# Patient Record
Sex: FEMALE | Race: WHITE | NOT HISPANIC OR LATINO | URBAN - METROPOLITAN AREA
[De-identification: names, ages, dates, MRNs, and addresses within clinical notes are randomized per-mention and may not be internally consistent; named-entity substitution may affect disease eponyms.]

---

## 2023-10-09 ENCOUNTER — HOSPITAL ENCOUNTER (EMERGENCY)
Facility: HOSPITAL | Age: 63
Discharge: HOME | End: 2023-10-09
Payer: COMMERCIAL

## 2023-10-09 VITALS
DIASTOLIC BLOOD PRESSURE: 92 MMHG | BODY MASS INDEX: 30.31 KG/M2 | WEIGHT: 164.68 LBS | SYSTOLIC BLOOD PRESSURE: 128 MMHG | RESPIRATION RATE: 12 BRPM | HEIGHT: 62 IN | OXYGEN SATURATION: 100 % | HEART RATE: 99 BPM | TEMPERATURE: 99.1 F

## 2023-10-09 DIAGNOSIS — R21 RASH: Primary | ICD-10-CM

## 2023-10-09 PROCEDURE — 99283 EMERGENCY DEPT VISIT LOW MDM: CPT

## 2023-10-09 PROCEDURE — 2500000001 HC RX 250 WO HCPCS SELF ADMINISTERED DRUGS (ALT 637 FOR MEDICARE OP): Performed by: STUDENT IN AN ORGANIZED HEALTH CARE EDUCATION/TRAINING PROGRAM

## 2023-10-09 PROCEDURE — 2500000004 HC RX 250 GENERAL PHARMACY W/ HCPCS (ALT 636 FOR OP/ED): Performed by: STUDENT IN AN ORGANIZED HEALTH CARE EDUCATION/TRAINING PROGRAM

## 2023-10-09 RX ORDER — HYDROXYZINE PAMOATE 25 MG/1
50 CAPSULE ORAL ONCE
Status: COMPLETED | OUTPATIENT
Start: 2023-10-09 | End: 2023-10-09

## 2023-10-09 RX ORDER — HYDROXYZINE HYDROCHLORIDE 10 MG/1
50 TABLET, FILM COATED ORAL ONCE
Status: DISCONTINUED | OUTPATIENT
Start: 2023-10-09 | End: 2023-10-09

## 2023-10-09 RX ORDER — PREDNISONE 20 MG/1
60 TABLET ORAL ONCE
Status: COMPLETED | OUTPATIENT
Start: 2023-10-09 | End: 2023-10-09

## 2023-10-09 RX ORDER — LISINOPRIL 20 MG/1
20 TABLET ORAL DAILY
COMMUNITY
Start: 2023-05-12

## 2023-10-09 RX ORDER — HYDROCHLOROTHIAZIDE 12.5 MG/1
12.5 TABLET ORAL DAILY
COMMUNITY
Start: 2023-05-12

## 2023-10-09 RX ORDER — PREDNISONE 20 MG/1
TABLET ORAL
Qty: 24 TABLET | Refills: 0 | Status: SHIPPED | OUTPATIENT
Start: 2023-10-09 | End: 2023-10-24

## 2023-10-09 RX ORDER — FAMOTIDINE 20 MG/1
20 TABLET, FILM COATED ORAL ONCE
Status: COMPLETED | OUTPATIENT
Start: 2023-10-09 | End: 2023-10-09

## 2023-10-09 RX ADMIN — HYDROXYZINE PAMOATE 50 MG: 25 CAPSULE ORAL at 03:28

## 2023-10-09 RX ADMIN — FAMOTIDINE 20 MG: 20 TABLET, FILM COATED ORAL at 03:28

## 2023-10-09 RX ADMIN — PREDNISONE 60 MG: 20 TABLET ORAL at 03:28

## 2023-10-09 ASSESSMENT — PAIN SCALES - GENERAL
PAINLEVEL_OUTOF10: 0 - NO PAIN
PAINLEVEL_OUTOF10: 0 - NO PAIN

## 2023-10-09 ASSESSMENT — COLUMBIA-SUICIDE SEVERITY RATING SCALE - C-SSRS
6. HAVE YOU EVER DONE ANYTHING, STARTED TO DO ANYTHING, OR PREPARED TO DO ANYTHING TO END YOUR LIFE?: NO
1. IN THE PAST MONTH, HAVE YOU WISHED YOU WERE DEAD OR WISHED YOU COULD GO TO SLEEP AND NOT WAKE UP?: NO
2. HAVE YOU ACTUALLY HAD ANY THOUGHTS OF KILLING YOURSELF?: NO

## 2023-10-09 ASSESSMENT — PAIN - FUNCTIONAL ASSESSMENT: PAIN_FUNCTIONAL_ASSESSMENT: 0-10

## 2023-10-09 NOTE — ED PROVIDER NOTES
"HPI   Chief Complaint   Patient presents with    Rash     3 days ago bought a bra from [employer] and wore it w/o washing it and after an hour it felt \"like it was burning around the band\" and there was a rash and it has since spread to all over her body. Has been managing it w/ benadryl and 1% Hydrocortisone cream. Not doing enough. Itchy, rash is raised red, blotchy and itchy all over her body and still spreading. Patient denies any cold/flu symptoms but has newly started getting winded going up the stairs. Has not had any vaccines recently. Travels every month.       Patient presents to the ED with rash which has been spreading for the past 3 days.  States that she wore a new article of clothing without washing it first when the rash started, and the rash was limited to where the new article of clothing touched her skin.  The following day, the rash started spreading distally from this point, and today she now has the rash on all of her limbs, her trunk, and her neck.  No mucous membrane involvement.  No constitutional symptoms.  Rash is intensely pruritic, and she has been using 1% hydrocortisone cream topically and taking 50 mg of Benadryl every 6 hours without relief.  Denies taking any new medications.  States that she did start using new detergent but that was 2 weeks ago.  No contacts with similar rash.        Review of Systems   All other systems reviewed and are negative.                      No data recorded                Patient History   Past Medical History:   Diagnosis Date    Hypertension      Past Surgical History:   Procedure Laterality Date    ABDOMINAL SURGERY      COLON SURGERY       No family history on file.  Social History     Tobacco Use    Smoking status: Former     Types: Cigarettes    Smokeless tobacco: Never   Vaping Use    Vaping Use: Never used   Substance Use Topics    Alcohol use: Yes     Alcohol/week: 9.0 standard drinks of alcohol     Types: 9 Standard drinks or equivalent per week "    Drug use: Yes     Frequency: 1.0 times per week     Types: Other     Comment: CBD w/ low THC       Physical Exam   ED Triage Vitals [10/09/23 0250]   Temp Heart Rate Resp BP   37.3 °C (99.1 °F) 99 12 (!) 128/92      SpO2 Temp Source Heart Rate Source Patient Position   98 % Oral Monitor Sitting      BP Location FiO2 (%)     Left arm --       Physical Exam  Constitutional:       General: She is not in acute distress.     Appearance: Normal appearance.   HENT:      Head: Normocephalic and atraumatic.      Nose: Nose normal.      Mouth/Throat:      Mouth: Mucous membranes are moist.      Pharynx: Oropharynx is clear.   Eyes:      Extraocular Movements: Extraocular movements intact.      Conjunctiva/sclera: Conjunctivae normal.      Pupils: Pupils are equal, round, and reactive to light.   Musculoskeletal:         General: No swelling or deformity.      Cervical back: Normal range of motion and neck supple.   Skin:     General: Skin is warm and dry.      Findings: Rash present. Rash is macular, papular and urticarial. Rash is not crusting, nodular, purpuric, scaling or vesicular.      Comments: Confluent erythematous papules with large plaque like areas on all limbs, trunk, and neck, blanchable   Neurological:      General: No focal deficit present.      Mental Status: She is alert and oriented to person, place, and time. Mental status is at baseline.   Psychiatric:         Mood and Affect: Mood normal.         Behavior: Behavior normal.         Thought Content: Thought content normal.         Judgment: Judgment normal.         ED Course & MDM   Diagnoses as of 10/09/23 0317   Rash       Medical Decision Making  Patient presents with spreading rash which appears to be urticarial, and extensive.  Started the patient on prednisone 60 mg and prescribed a 15-day taper, and also gave patient Vistaril and Pepcid.  She can continue to take Benadryl and may add Pepcid outpatient if desired.  Additionally, can continue to use  hydrocortisone cream topically for itch relief.           Delmi Smith MD  10/09/23 7564

## 2023-10-09 NOTE — ED TRIAGE NOTES
"Patient has full body rash minus face and feet. Patient believes it is related to a bra she did not wash after buying and had a burning sensation around the band around her chest. It has now spread all over since in 3 days. It is described as \"incredibly itchy\". Denies changes in daily habits, new detergents or medications. Has been trying to manage symptoms w/ benadryl and hydrocortisone cream, w/o much results.     Patient is roomed in ED 2, triage complete and physician has seen patient and completed her initial assessment. Nothing further at the moment. Patient is resting comfortably w/o distress.   "

## 2023-10-09 NOTE — DISCHARGE INSTRUCTIONS
May continue to take Benadryl 50 mg every 6 hours, and can add Pepcid if desired. Take steroid as directed. May use topical medication as needed.

## 2025-06-02 ENCOUNTER — HOSPITAL ENCOUNTER (OUTPATIENT)
Dept: RADIOLOGY | Facility: HOSPITAL | Age: 65
Discharge: HOME | End: 2025-06-02
Payer: MEDICARE

## 2025-06-02 ENCOUNTER — OFFICE VISIT (OUTPATIENT)
Dept: ORTHOPEDIC SURGERY | Facility: HOSPITAL | Age: 65
End: 2025-06-02
Payer: MEDICARE

## 2025-06-02 DIAGNOSIS — M25.559 HIP PAIN, ACUTE, UNSPECIFIED LATERALITY: ICD-10-CM

## 2025-06-02 DIAGNOSIS — M16.11 PRIMARY OSTEOARTHRITIS OF RIGHT HIP: Primary | ICD-10-CM

## 2025-06-02 PROCEDURE — 99212 OFFICE O/P EST SF 10 MIN: CPT | Performed by: STUDENT IN AN ORGANIZED HEALTH CARE EDUCATION/TRAINING PROGRAM

## 2025-06-02 PROCEDURE — 1125F AMNT PAIN NOTED PAIN PRSNT: CPT | Performed by: STUDENT IN AN ORGANIZED HEALTH CARE EDUCATION/TRAINING PROGRAM

## 2025-06-02 PROCEDURE — 99205 OFFICE O/P NEW HI 60 MIN: CPT | Performed by: STUDENT IN AN ORGANIZED HEALTH CARE EDUCATION/TRAINING PROGRAM

## 2025-06-02 PROCEDURE — 73521 X-RAY EXAM HIPS BI 2 VIEWS: CPT

## 2025-06-02 ASSESSMENT — PAIN - FUNCTIONAL ASSESSMENT: PAIN_FUNCTIONAL_ASSESSMENT: 0-10

## 2025-06-02 ASSESSMENT — PAIN DESCRIPTION - DESCRIPTORS: DESCRIPTORS: ACHING

## 2025-06-02 ASSESSMENT — PAIN SCALES - GENERAL: PAINLEVEL_OUTOF10: 3

## 2025-06-02 NOTE — PROGRESS NOTES
Abby Morgan MD   Adult Reconstruction and Joint Replacement Surgery  Phone: [phone]     Fax: [phone]       Name: Jo-Ann Lugo  Age: 65 y.o.   : 1960   Date of Visit: 2025    INITIAL CONSULTATION    CC: RIGHT hip pain    HPI:  This patient presents with 1 years of RIGHT hip pain. She reports 1 year of insidious onset groin and lateal hip pain, worse with activity. She reprots symptoms are most bothersome at the end of the day and when she transitions from sitting to standing.     Patient has tried the following Activity modification, NSAIDs, Tylenol (arthritis dosing) , Physical therapy, and Xray. Date of last steroid injection: none. Patient does not have pain at night. Patient does not report falls related to this problem. Patient is able to walk unlimited blocks. Patient is currently using nothing as assistive device. Primarily complains of groin and lateral hip pain. Patient has difficulty with donning and doffing shoes and socks and getting in/out of car . The pain is significantly impacting their ability to perform activities of daily living. Patient reports no longer able to do activities such as transition from sitting to standing. The patient feels neither leg is shorter.     Focused History  *Directly transcribed from patient self-reported intake sheet and [facility] Records.      PMH: Reviewed and PE/DVT: none  PSH: Reviewed , Hip/Knee replacement: none, Hip/Knee surgery: none, Anesthesia complications: none, Spine surgery: none, Surgical infection: none, and Weight loss surgery: none  SHx: Reviewed, Occupation: self employed, Current smoker: denies, EtOH intake weekly: 7 drinks/week, Social support: G[employer], and Preferred physical activities: piliates/walking/photography/art  Jehovah´s Witness: no  Meds: Reviewed, Current Anticoagulants: none, Weight loss medication: none, and Current Opioids: none  Allergies: Reviewed  and The patient reports no  contraindications or allergies to cephalosporins, aspirin, NSAIDs or opioids, except as noted above.  Dental Hx: Last routine cleanin2025 and All invasive dental work must be completed 3+ months prior to joint replacement surgery. Dental cleaning must be completed 6+ weeks prior to joint replacement surgery. Patient are to avoid any invasive dental work 3-6 months post-surgically.   FH: No family history of any bleeding or clotting disorders.    PROMS/HISTORY  PROMs   No questionnaires on file.     Medical History[1]    Medical History[2]  Documented in chart and reviewed.     Surgical History[3]    Allergies: She is allergic to sulfa (sulfonamide antibiotics).     Medications:  Current Outpatient Medications   Medication Instructions    hydroCHLOROthiazide (MICROZIDE) 12.5 mg, oral, Daily    lisinopril 20 mg, oral, Daily       Family History[4]  Documented in chart and reviewed.     Social History     Tobacco Use    Smoking status: Former     Types: Cigarettes    Smokeless tobacco: Never   Substance Use Topics    Alcohol use: Yes     Alcohol/week: 9.0 standard drinks of alcohol     Types: 9 Standard drinks or equivalent per week        Review of Systems: Review of systems completed with medical assistant intake. Please refer to this note.     Physical Exam:  BMI: normal.    General: The patient is well appearing and has an appropriate affect.     Neurological Examination: SILT in SPN/DPN/Sural/Saphenous/Tibial nerves. 5/5 EHL, FHL, Tibial anterior, Gastrocnemius. Coordination grossly intact.     Cardiovascular Exam: Capillary refill <2 seconds.     Lymphatic Examination: There is no obvious lymphatic swelling present around the involved joint.    Skin Exam: Skin around the pertinent joint is without evidence of infection or rash.    Gait: The patient ambulates with a coxalgic gait.     Lumbar spine:    No tenderness to palpation midline.    Negative straight leg raise bilaterally.    Right Hip  "Examination:  Gait: Coxalgic gait.    Examination of the hip reveals the skin to be intact.    There is mild tenderness over the greater trochanter.    There is no obvious swelling.    There is a positive Stinchfield test.    Range of motion is: full extension to 105 degrees of flexion.    The hip internally rotates to 5 degrees and externally rotates to 35 degrees.    Abduction is 35 degrees and adduction is 15 degrees.    There is groin and buttock pain with hip motion.    There is a negative straight leg raise.    Abductor strength 4/5.    Left Hip Examination:  Examination of the hip reveals the skin to be intact.    There is no tenderness over the greater trochanter.    There is no obvious swelling.    There is a negative Stinchfield test.    Range of motion is full extension to 100 degrees of flexion.    The hip internally rotates to 20 and externally rotates 40 degrees.    Abduction is 40 degrees and adduction is 20 degrees.    There is no groin and buttock pain with hip motion.    There is a negative straight leg raise.    Abductor strength 4/5.    Right Knee Examination:  Examination of the right knee reveals the skin to be intact. There is no obvious swelling.    There is no tenderness to palpation.    Range of motion is full extension to 120 degrees of flexion.    The knee is stable.    There is no grinding with range of motion.    There is no patellofemoral crepitus.    Left Knee Examination:  Examination of the left knee reveals the skin to be intact. There is no obvious swelling.    There is no tenderness to palpation.    Range of motion is full extension to 120 degrees of flexion.    The knee is stable.    There is no grinding with range of motion.    There is no patellofemoral crepitus.    Prior Labs:   Prior Labs:   No results found for: \"WBC\", \"HGB\", \"HCT\", \"MCV\", \"PLT\"   No results found for: \"INR\", \"PROTIME\"      No results found for: \"GLUCOSE\", \"CALCIUM\", \"NA\", \"K\", \"CO2\", \"CL\", \"BUN\", " "\"CREATININE\"   No results found for: \"CKTOTAL\", \"CKMB\", \"CKMBINDEX\", \"TROPONINI\"   No results found for: \"HGBA1C\"      No results found for: \"CRP\"   No results found for: \"SEDRATE\"      Radiographs:  Radiographs were personally reviewed today with the patient. There is evidence of severe RIGHT  hip osteoarthritis with bone on bone apposition.    Impression:  This patient presents with severe RIGHT  hip osteoarthritis with bone on bone apposition.     Diagnosis:   Primary osteoarthritis of right hip    Hip pain, acute, unspecified laterality     Recommendations / Plan:    I have discussed the options in detail with the patient. We have discussed anti-inflammatory medication, activity modification, physical therapy, corticosteroid injections, and total hip replacement surgery.     The risks and benefits of all these treatment options have been discussed in detail. The patient has tried at least 3 months of the above conservative treatments and continues to have disabling pain, impaired activities of daily living and worsened quality of life. The patient is a candidate for RIGHT  VERONICA total hip replacement. We discussed anterior and posterolateral surgical approaches to the hip joint with my rationale for anterior approach. Risks and benefits of all approaches were reviewed. We discussed expected rehabilitation, DVT prophylaxis, time points during recovery, likely functional outcomes and long-term issues with hip replacement procedures.    We have reviewed the typical recovery after surgery and have discussed the fact that full recovery can take up to 1 year. For anterior approach surgery, I specifically discussed the increased risk for intra-operative fracture, the risk of aseptic femoral failure, and the risk for lateral femoral cutaneous nerve injury.    The patient does not have any of the following contraindications to arthroplasty including active infection of the hip joint, systemic bacteremia, active skin " infection or an open wound at the surgical site, neuropathic arthritis or severe, rapidly progressive neurological disease.     Patient will consider proceeding with RIGHT  VERONICA. All questions were answered today. If the patient chooses to move forward with surgical scheduling, they will be enrolled in a preoperative arthroplasty teaching class and the pre-admission testing pathway. The patient was encouraged to contact their primary care physician to discuss fitness for surgery.     Social support after surgery: family  Pain medication plan: Standard (Acetominophen, Meloxicam, Oxycodone, Tramadol)   Additional preoperative considerations: None    Diagnosis: M16.11 - RIGHT hip osteoarthritis   Procedure: 16109 - Total HIP Arthroplasty (VERONICA) - ANTERIOR  Surgery Location: [facility]   Equipment Requests: VERONICA - ANTERIOR: Brewer table accessories, Midas Javier Finger Control with AS14 attachment, Cylindrical carlos alberto ([id number]) and DEPUY: Emphasys, Actis  Anesthesia: VERONICA: Regional - Spinal   Discharge: Same-day (RAPID)    \F2  _____________  Abby Morgan MD   Attending Orthopaedic Surgeon  [facility]    [facility]       This office note was transcribed with dictation software.  Please excuse any typographical errors, program misunderstandings leading to inadvertent insertions or deletions of inappropriate wording, pronoun errors and other unintentional transcription errors not noticed on proof-reading.                    [1]   Past Medical History:  Diagnosis Date    Hypertension    [2]   Past Medical History:  Diagnosis Date    Hypertension    [3]   Past Surgical History:  Procedure Laterality Date    ABDOMINAL SURGERY      COLON SURGERY     [4] No family history on file.

## 2025-06-05 PROBLEM — M16.11 PRIMARY OSTEOARTHRITIS OF RIGHT HIP: Status: ACTIVE | Noted: 2025-08-07

## 2025-07-03 ENCOUNTER — EDUCATION (OUTPATIENT)
Dept: ORTHOPEDIC SURGERY | Facility: CLINIC | Age: 65
End: 2025-07-03
Payer: MEDICARE

## 2025-07-03 NOTE — GROUP NOTE
In addition to the group class activities, Jo-Ann Lugo had the following lab work completed:  No orders of the defined types were placed in this encounter.    Jo-Ann Lugo  attended joint class on 7/3 and Did not bring a care partner to the class. The preop survey was completed and the patient provided attestation that they understand the content reviewed and that they do have a care partner identified to assist with recovery. Patient was provided with a folder of materials and instructed to call orthopedic navigator with questions.   A new History and Physical was not completed.    This class lasted approximately 2 hours and had 10 participants.   Thank you for attending our Joint Replacement class today in preparation for your upcoming surgery.  Topics discussed include:    MyChart Enrollment  Communication with Care Team  My Chart is the best form of communication to reach all of your caregivers  You can send messages to specific care givers, or a care team  Continued Education  You will be enrolled in a Total Joint Replacement care plan to receive additional education before and after surgery  You can review a short recording of the class content  Access to Medical Records  You can access test results, office notes, appointments, etc.  You can connect to other healthcare systems who use [employer] ([facility], [facility], [facility], etc.)  [email or web address]  Program Information  Using Meds to Beds is our standard process for joint replacements at [facility] to minimize issues with homegoing medications. Please let us know ahead of time if there is a reason why Meds to Beds cannot be used    Background/Understanding of Joint Replacement Surgery  Potential for same day discharge  Any questions or concerns about your specific surgery/plan are to be directed to the surgeon's office    How to Prepare for Surgery  Use of Nicotine Products/Smoking  Stop several weeks before surgery  Such products slow down the healing process and  increase risk of post-op infection and complications  Clearance for Surgery  Medical Clearance by Specialists  Dental Clearance  Cracked/Broken/Loose teeth left untreated may postpone surgery  The importance of post-op antibiotics for dental visits per surgeon protocol  Preadmission Testing  **Potential for postponed surgery if appropriate clearance is not obtained  Medication Instruction  Follow instructions provided by the doctor who prescribes your medication (typically, but not limited to cardiologist)  Preadmission testing will provide additional instructions during your appointment on what to stop and what to take as you get closer to surgery  For clarification of these instructions, please call preadmission testing directly - [phone]  Tips for Preparing the home for discharge from the hospital  Care Partner  Requirement for surgery, the patient must have a plan to have help at home  Potential for postponed surgery if plan for home support cannot be established  How the care partner can help after surgery  CHG Body Wash/Mouth Wash  Follow the instructions given at preadmission testing  Body wash is to be used on the body and hair for 5 washes  Mouthwash is to be used the night before and morning of surgery  **This is a system-wide protocol developed by infectious disease professionals, we will not alter our recommendations for those with sensitive skin or those who have special hair needs.  Please follow the instructions as they are written as this will provide the best infection prevention measures for surgery.  Should you have an allergy to one of the products, please discuss with your preadmission team**    What to Expect in the Hospital/At Home  Morning of Surgery NPO Guidelines  Nothing to eat after midnight  Water can be consumed up to 2 hours prior to arrival  Surgical and Post-Surgical Care Team  Surgical Team  Anesthesia Team  Nursing  Physical Therapy  Care [facility]  Arrival Instructions  Arrive at the time provided to you  Consider traffic patterns (rush-hour) based on arrival time  Have arrangements made for a ride home  If discharging same day, care partner should remain at the hospital  Recovering after Surgery  Recovery Room - Visitors are not brought back  Transition to hospital room - 2nd Floor, Visitors will be directed to your room  The presence of and strategies for controlling surgical pain and swelling  The importance of early mobility  Side effects after surgery  What to expect if staying overnight    Discharge Planning  The intended plan for discharge will be for patients to discharge home  All patients require a care partner (family, friend, neighbor, etc.) to stay with the patient for the first few nights after surgery  The inability to secure help at home will postpone surgery  Home Care Services set up per surgeon order  Physical Therapy  Occupational Therapy  **If desired, private duty care can be arranged by the patient ahead of time**  Outpatient Physical Therapy per surgeon order    Recovering at Home  Wound Care  Follow wound care instructions found in your discharge paperwork  Bandage is water-resistant and you may shower with the bandage  Do not scrub directly over the bandage  Do not submerge in water until cleared (bathtub, hot tub, pool, etc.)    Post-Op Risk Prevention  Infection Prevention  Promptly seek treatment for any infections post-operatively  Routine dental visits must be postponed for 3 months after surgery  Your surgeon may require antibiotics prior to future dental visits  Any concerns for infection not related directly to the knee or the hip should be managed by your primary care provider  Blood Clots  Be sure to complete the course of blood thinning medication as prescribed by your surgeon  Movement every 1-2 hours during the day is encouraged to prevent blood clots  Monitor for signs of blood clots  Wear compression stockings as  prescribed by your surgeon  Constipation  Constipation is common following surgery  Drink plenty of fluids  Take stool softener/laxative as prescribed by your surgeon  Move around frequently  Eat foods high in fiber  Fall Prevention  Prepare home ahead of time to clear space to move with walker  Remove throw rugs and electrical cords from walkways  Install railings near any stairways with more than 2 steps  Use night lights for increased visibility at night  Continue to use your assistive device until cleared by surgeon or physical therapy  Dislocation Prevention - Not all procedures will have dislocation precautions  Follow dislocation precautions provided by your surgeon  It is OK to resume sexual activity about 6 weeks following surgery  Be sure to follow any dislocation precautions assigned    Durable Medical Equipment  Cold Therapy  Breg Cold Therapy Machines  Ice/Gel Packs  Assistive Devices  Folding Walker with Wheels (in the front only)  No Rollators  Crutches if approved by Physical Therapy and Surgeon after surgery  Hip Kits  Raised Toilet Seats  Additional Compression Stockings    Joint Preservation  Healthy Activities when Cleared  Walking  Swimming  Bike Riding  Activities to Avoid  Refrain from repetitive motions which have a high impact on the joint  Gradual Progression  Progress activity slowly, listen to your body  Common Findings - NORMAL after surgery  Clicking/Grinding  Numbness near incision    Physical Therapy  Prehabilitation exercises  START TODAY ON BOTH LEGS  Surgery Specific Precautions  Follow surgery specific precautions found in your discharge paperwork    Follow-Up Visit  All patients will see their surgeon for a follow up visit after surgery  The visit may range from 2-6 weeks after surgery and is surgeon specific      Please don't hesitate to reach out if you have any additional questions or concerns.    Scott Garland BSN, RN  Luzmaria Corado BSN, RN-BC  Orthopedic Nurses  [place]  [facility]   [phone]

## 2025-07-09 ENCOUNTER — PATIENT MESSAGE (OUTPATIENT)
Dept: PAIN MEDICINE | Facility: CLINIC | Age: 65
End: 2025-07-09
Payer: MEDICARE

## 2025-07-23 ENCOUNTER — APPOINTMENT (OUTPATIENT)
Dept: ORTHOPEDIC SURGERY | Facility: HOSPITAL | Age: 65
End: 2025-07-23
Payer: MEDICARE

## 2025-07-28 ENCOUNTER — APPOINTMENT (OUTPATIENT)
Dept: RADIOLOGY | Facility: HOSPITAL | Age: 65
End: 2025-07-28
Payer: MEDICARE

## 2025-07-28 ENCOUNTER — OFFICE VISIT (OUTPATIENT)
Dept: ORTHOPEDIC SURGERY | Facility: HOSPITAL | Age: 65
End: 2025-07-28
Payer: MEDICARE

## 2025-07-28 ENCOUNTER — TELEPHONE (OUTPATIENT)
Dept: PREADMISSION TESTING | Facility: HOSPITAL | Age: 65
End: 2025-07-28

## 2025-07-28 ENCOUNTER — HOSPITAL ENCOUNTER (OUTPATIENT)
Dept: RADIOLOGY | Facility: HOSPITAL | Age: 65
Discharge: HOME | End: 2025-07-28
Payer: MEDICARE

## 2025-07-28 ENCOUNTER — HOSPITAL ENCOUNTER (OUTPATIENT)
Dept: RADIOLOGY | Facility: HOSPITAL | Age: 65
End: 2025-07-28
Payer: MEDICARE

## 2025-07-28 DIAGNOSIS — M16.11 PRIMARY OSTEOARTHRITIS OF RIGHT HIP: ICD-10-CM

## 2025-07-28 DIAGNOSIS — M16.11 PRIMARY OSTEOARTHRITIS OF RIGHT HIP: Primary | ICD-10-CM

## 2025-07-28 PROCEDURE — 99212 OFFICE O/P EST SF 10 MIN: CPT | Performed by: STUDENT IN AN ORGANIZED HEALTH CARE EDUCATION/TRAINING PROGRAM

## 2025-07-28 PROCEDURE — 77073 BONE LENGTH STUDIES: CPT

## 2025-07-28 PROCEDURE — 1125F AMNT PAIN NOTED PAIN PRSNT: CPT | Performed by: STUDENT IN AN ORGANIZED HEALTH CARE EDUCATION/TRAINING PROGRAM

## 2025-07-28 PROCEDURE — 77073 BONE LENGTH STUDIES: CPT | Performed by: RADIOLOGY

## 2025-07-28 PROCEDURE — 72170 X-RAY EXAM OF PELVIS: CPT

## 2025-07-28 PROCEDURE — 1159F MED LIST DOCD IN RCRD: CPT | Performed by: STUDENT IN AN ORGANIZED HEALTH CARE EDUCATION/TRAINING PROGRAM

## 2025-07-28 PROCEDURE — 99214 OFFICE O/P EST MOD 30 MIN: CPT | Performed by: STUDENT IN AN ORGANIZED HEALTH CARE EDUCATION/TRAINING PROGRAM

## 2025-07-28 ASSESSMENT — PAIN SCALES - GENERAL: PAINLEVEL_OUTOF10: 8

## 2025-07-28 ASSESSMENT — PAIN DESCRIPTION - DESCRIPTORS: DESCRIPTORS: ACHING

## 2025-07-28 ASSESSMENT — PAIN - FUNCTIONAL ASSESSMENT: PAIN_FUNCTIONAL_ASSESSMENT: 0-10

## 2025-07-28 NOTE — PROGRESS NOTES
Abby Morgan MD   Adult Reconstruction and Joint Replacement Surgery  Phone: [phone]     Fax: [phone]       Name: Jo-Ann Lugo  Age: 65 y.o.   : 1960   Date of Visit: 2025    PREOPERATIVE CONSULTATION    CC: Right hip pain    HPI:  This patient presents for discussion of upcoming RIGHT total HIP arthroplasty.     The patient reports 1 years of RIGHT hip pain.     The patient has tried at least 3 months of conservative treatments and continues to have disabling pain, impaired activities of daily living and worsened quality of life. They rate their pain and/or debilitation as severe at times.     Patient has tried the following Activity modification, NSAIDs, Tylenol (arthritis dosing) , Physical therapy, and Xray. Date of last steroid injection: none. Patient does not have pain at night. Patient does not report falls related to this problem. Patient is able to walk unlimited blocks. Patient is currently using nothing as assistive device. Primarily complains of groin and lateral hip pain. Patient has difficulty with donning and doffing shoes and socks and getting in/out of car . The pain is significantly impacting their ability to perform activities of daily living. Patient reports no longer able to do activities such as transition from sitting to standing. The patient feels neither leg is shorter.      Focused History  *Directly transcribed from patient self-reported intake sheet and [facility] Medical Records.       PMH: Reviewed and PE/DVT: none  PSH: Reviewed , Hip/Knee replacement: none, Hip/Knee surgery: none, Anesthesia complications: none, Spine surgery: none, Surgical infection: none, and Weight loss surgery: none  SHx: Reviewed, Occupation: self employed, Current smoker: denies, EtOH intake weekly: 7 drinks/week, Social support: Gino Cotton, and Preferred physical activities: piliates/walking/photography/art  Jehovah´s Witness: no  Meds: Reviewed, Current Anticoagulants: none,  Weight loss medication: none, and Current Opioids: none  Allergies: Reviewed  and The patient reports no contraindications or allergies to cephalosporins, aspirin, NSAIDs or opioids, except as noted above.  Dental Hx: Last routine cleanin2025 and All invasive dental work must be completed 3+ months prior to joint replacement surgery. Dental cleaning must be completed 6+ weeks prior to joint replacement surgery. Patient are to avoid any invasive dental work 3-6 months post-surgically.   FH: No family history of any bleeding or clotting disorders.    PROMs/HISTORY  PROMs   Hoos Jr-Hip Disability And Osteoarthritis Outcome Score For Joint Replacement    2025  7:43 AM EDT - Filed by Patient   Instructions    What amount of hip pain have you experienced the last week during the following activities?   Going up or down stairs Mild   Walking on an uneven surface Moderate   The following questions concern your physical function. By this we mean your ability to move around and to look after yourself. For each of the following activities please indicate the degree of difficulty you have experienced in the last week due to your hip.   Rising from sitting Severe   Bending to floor/ an object Moderate   Lying in bed (turning over, maintaining hip position) Mild   Sitting Mild   Hoos Jr Scoring (range: 0 - 100) 58.93     [facility] 12 Item Health Survey (Vr-12)    2025  7:47 AM EDT - Filed by Patient   In general, would you say your health is: Very Good   The following questions are about activities you might do during a typical day. Does your health now limit you in these activities?  If so, how much?   Moderate activities, such as moving a table, pushing a vacuum , bowling, or playing golf? Yes, Limited A Little   Climbing several flights of stairs? Yes, Limited A Little   During the past 4 weeks, have you had any of the following problems with your work or other regular daily activities as a  result of your physical health?   Accomplished less than you would like. Yes, A Little Of The Time   Were limited in the kind of work or other activities. Yes, A Little Of The Time   During the past 4 weeks, have you had any of the following problems with your work or other regular daily activities as a result of any emotional problems (such as feeling depressed or anxious)?   Accomplished less than you would like No, None Of The Time   Didn't do work or other activities as carefully as usual No, None Of The Time   During the past 4 weeks, how much did pain interfere with your normal work (including both work outside the home and house work)? A Little Bit   How much of the time during the past 4 weeks:   Have you felt calm and peaceful? Most Of The Time   Did you have a lot of energy? A Good Bit Of The Time   Have you felt downhearted and blue? None Of The Time   During the past 4 weeks, how much of the time has your physical health or emotional problems interfered with your social activities (like visiting with friends, relatives, etc.)? A Little Of The Time   Compared to one year ago, how would you rate your physical health in general now? Slightly Worse   Compared to one year ago, how would you rate your emotional problems (such as feeling anxious, depressed or irritable) now? About The Same   Ort Vr-12 Question Pcs (range: 0 - 100) 40.11   Ort Vr-12 Question McS (range: 0 - 100) 60.42          Medical History[1]    Medical History[2]  Documented in chart and reviewed.     Surgical History[3]    Allergies: She is allergic to sulfa (sulfonamide antibiotics).     Medications:  Current Outpatient Medications   Medication Instructions    hydroCHLOROthiazide (MICROZIDE) 12.5 mg, oral, Daily    lisinopril 20 mg, oral, Daily       Family History[4]  Documented in chart and reviewed.     Social History     Tobacco Use    Smoking status: Former     Types: Cigarettes    Smokeless tobacco: Never   Substance Use Topics     Alcohol use: Yes     Alcohol/week: 9.0 standard drinks of alcohol     Types: 9 Standard drinks or equivalent per week        Review of Systems: Review of systems completed with medical assistant intake. Please refer to this note.     Physical Exam:  BMI: Normal.    General: The patient is well appearing and has an appropriate affect.      Neurological Examination: SILT in SPN/DPN/Sural/Saphenous/Tibial nerves. 5/5 EHL, FHL, Tibial anterior, Gastrocnemius. Coordination grossly intact.      Cardiovascular Exam: Capillary refill <2 seconds.      Lymphatic Examination: There is no obvious lymphatic swelling present around the involved joint.     Skin Exam: Skin around the pertinent joint is without evidence of infection or rash.     Gait: The patient ambulates with a coxalgic gait.      Lumbar spine:     No tenderness to palpation midline.     Negative straight leg raise bilaterally.     Right Hip Examination:  Gait: Coxalgic gait.     Examination of the hip reveals the skin to be intact.     There is mild tenderness over the greater trochanter.     There is no obvious swelling.     There is a positive Stinchfield test.     Range of motion is: full extension to 105 degrees of flexion.     The hip internally rotates to 5 degrees and externally rotates to 35 degrees.     Abduction is 35 degrees and adduction is 15 degrees.     There is groin and buttock pain with hip motion.     There is a negative straight leg raise.     Abductor strength 4/5.     Left Hip Examination:  Examination of the hip reveals the skin to be intact.     There is no tenderness over the greater trochanter.     There is no obvious swelling.     There is a negative Stinchfield test.     Range of motion is full extension to 100 degrees of flexion.     The hip internally rotates to 20 and externally rotates 40 degrees.     Abduction is 40 degrees and adduction is 20 degrees.     There is no groin and buttock pain with hip motion.     There is a negative  straight leg raise.     Abductor strength 4/5.     Right Knee Examination:  Examination of the right knee reveals the skin to be intact. There is no obvious swelling.     There is no tenderness to palpation.     Range of motion is full extension to 120 degrees of flexion.     The knee is stable.     There is no grinding with range of motion.     There is no patellofemoral crepitus.     Left Knee Examination:  Examination of the left knee reveals the skin to be intact. There is no obvious swelling.     There is no tenderness to palpation.     Range of motion is full extension to 120 degrees of flexion.     The knee is stable.     There is no grinding with range of motion.     There is no patellofemoral crepitus.    Radiographs:  Radiographs were personally reviewed today with the patient. There is evidence of severe RIGHT  hip osteoarthritis with bone on bone apposition.    Impression:  This patient presents with severe RIGHT  hip osteoarthritis with bone on bone apposition. This is now affecting activities of daily living. All appropriate nonsurgical management options have been tried and failed.    Diagnosis:   Primary osteoarthritis of right hip     Recommendations / Plan:    I have discussed the options in detail with the patient. We have discussed anti-inflammatory medication, activity modification, physical therapy, corticosteroid injections, and total hip replacement surgery.    The risks and benefits of all these treatment options have been discussed in detail. The patient has tried at least 3 months of the above conservative treatments and continues to have disabling pain, impaired activities of daily living and worsened quality of life. The patient is a candidate for RIGHT  total hip replacement. We discussed anterior and posterolateral surgical approaches to the hip joint with my rationale for anterior approach. Risks and benefits of all approaches were reviewed. We discussed expected rehabilitation, DVT  prophylaxis using Aspirin, time points during recovery, likely functional outcomes and long-term issues with hip replacement procedures.    We discussed the hospital stay, postoperative rehab and follow up required as well as the potential risks of surgery including bleeding, infection, need for reoperation, failure of the operation to provide symptomatic relief, leg length discrepancy, need for multiple revision surgeries in the setting of bleeding, wear, infection, instability, dislocation requiring closed and/or open reduction and/or revision, damage to major neurovascular structures, venous thrombolic disease, complications of regional and/or general anesthesia, as well as death. The patient also understands that a good result is not guaranteed and that poor outcomes can at times be unavoidable. The patient may also have persistent and chronic pain that could require further intervention. The patient confirms their understanding of the risks as well as the benefits of surgery.     We have reviewed the typical recovery after surgery and have discussed the fact that full recovery can take up to 1 year. Bearing surfaces were discussed in detail. The risks and benefits of all available bearing surfaces: metal on poly, Oxinium on poly, and dual mobility were discussed. Specifically, the risks of long-term wear and osteolysis were discussed. We also discussed the potential for metal hypersensitivity reactions that could potentially require further surgery.  For anterior approach surgery, I specifically discussed the increased risk for intra-operative fracture, the risk of aseptic femoral failure, and the risk for lateral femoral cutaneous nerve injury.    Discussed the potential use of cemented stem based on radiographic findings and demographics.  Reviewed registry data, which could suggest better outcome with lower risk of periprosthetic fracture with the use of cemented stem given demographics and radiographic  findings.  Risks of using a cemented stem or modular stem were reviewed, including cement embolism, thermal necrosis and corrosion, metallosis or implant fracture.     The patient does not have any of the following contraindications to arthroplasty including active infection of the hip joint, systemic bacteremia, active skin infection or an open wound at the surgical site, neuropathic arthritis or severe, rapidly progressive neurological disease.     Patient will consider proceeding with RIGHT  VERONICA. All questions were answered today. If the patient chooses to move forward with surgical scheduling, they will be enrolled in a preoperative arthroplasty teaching class and the pre-admission testing pathway. The patient was encouraged to contact their primary care physician to discuss fitness for surgery.     Social support after surgery: significant other  Pain medication plan: Standard (Acetominophen, Meloxicam, Oxycodone, Tramadol)   Additional preoperative considerations: None    Diagnosis: M16.11 - RIGHT hip osteoarthritis   Procedure: 47352 - Total HIP Arthroplasty (VERONICA) - ANTERIOR  Surgery Location: [facility]   Equipment Requests: VERONICA - ANTERIOR: [place] table accessories, Midas Javier Finger Control with AS14 attachment, Cylindrical carlos alberto ([id number]) and DEPUY: Emphasys, Actis  Anesthesia: VERONICA: Regional - Spinal   Discharge: Same-day (RAPID)      _____________  Abby Morgan MD   Attending Orthopaedic Surgeon  [facility]    [facility]       This office note was transcribed with dictation software.  Please excuse any typographical errors, program misunderstandings leading to inadvertent insertions or deletions of inappropriate wording, pronoun errors and other unintentional transcription errors not noticed on proof-reading.                                    [1]   Past Medical History:  Diagnosis Date    Anxiety     Hypertension     Insomnia     Primary osteoarthritis  of right hip     Plan: Right Hip Total Arthroplasty ~ Anterior Approach 8/7/25   [2]   Past Medical History:  Diagnosis Date    Anxiety     Hypertension     Insomnia     Primary osteoarthritis of right hip     Plan: Right Hip Total Arthroplasty ~ Anterior Approach 8/7/25   [3]   Past Surgical History:  Procedure Laterality Date    ABDOMINAL SURGERY      ANKLE HARDWARE REMOVAL  2007    ANKLE SURGERY      COLON SURGERY      OVARIAN CYST REMOVAL  2002   [4]   Family History  Problem Relation Name Age of Onset    Aneurysm Mother      Diabetes Mother      Hypertension Father      Cancer Sister          Cervical Cancer

## 2025-07-29 ENCOUNTER — TELEPHONE (OUTPATIENT)
Dept: ORTHOPEDIC SURGERY | Facility: HOSPITAL | Age: 65
End: 2025-07-29
Payer: MEDICARE

## 2025-07-30 ENCOUNTER — APPOINTMENT (OUTPATIENT)
Dept: LAB | Facility: HOSPITAL | Age: 65
End: 2025-07-30
Payer: MEDICARE

## 2025-07-30 ENCOUNTER — PRE-ADMISSION TESTING (OUTPATIENT)
Dept: PREADMISSION TESTING | Facility: HOSPITAL | Age: 65
End: 2025-07-30
Payer: MEDICARE

## 2025-07-30 VITALS
RESPIRATION RATE: 16 BRPM | TEMPERATURE: 97.9 F | DIASTOLIC BLOOD PRESSURE: 87 MMHG | OXYGEN SATURATION: 98 % | HEART RATE: 78 BPM | WEIGHT: 158.95 LBS | BODY MASS INDEX: 29.25 KG/M2 | SYSTOLIC BLOOD PRESSURE: 134 MMHG | HEIGHT: 62 IN

## 2025-07-30 DIAGNOSIS — Z01.818 PREOP TESTING: ICD-10-CM

## 2025-07-30 DIAGNOSIS — R73.09 ELEVATED GLUCOSE: Primary | ICD-10-CM

## 2025-07-30 LAB
ABO GROUP (TYPE) IN BLOOD: NORMAL
ANION GAP SERPL CALC-SCNC: 15 MMOL/L (ref 10–20)
ANTIBODY SCREEN: NORMAL
BASOPHILS # BLD AUTO: 0.02 X10*3/UL (ref 0–0.1)
BASOPHILS NFR BLD AUTO: 0.5 %
BUN SERPL-MCNC: 20 MG/DL (ref 6–23)
CALCIUM SERPL-MCNC: 9 MG/DL (ref 8.6–10.3)
CHLORIDE SERPL-SCNC: 102 MMOL/L (ref 98–107)
CO2 SERPL-SCNC: 24 MMOL/L (ref 21–32)
CREAT SERPL-MCNC: 0.95 MG/DL (ref 0.5–1.05)
EGFRCR SERPLBLD CKD-EPI 2021: 66 ML/MIN/1.73M*2
EOSINOPHIL # BLD AUTO: 0.23 X10*3/UL (ref 0–0.7)
EOSINOPHIL NFR BLD AUTO: 5.5 %
ERYTHROCYTE [DISTWIDTH] IN BLOOD BY AUTOMATED COUNT: 12.2 % (ref 11.5–14.5)
EST. AVERAGE GLUCOSE BLD GHB EST-MCNC: 100 MG/DL
GLUCOSE SERPL-MCNC: 88 MG/DL (ref 74–99)
HBA1C MFR BLD: 5.1 % (ref ?–5.7)
HCT VFR BLD AUTO: 37.5 % (ref 36–46)
HGB BLD-MCNC: 12.4 G/DL (ref 12–16)
IMM GRANULOCYTES # BLD AUTO: 0.01 X10*3/UL (ref 0–0.7)
IMM GRANULOCYTES NFR BLD AUTO: 0.2 % (ref 0–0.9)
LYMPHOCYTES # BLD AUTO: 1.55 X10*3/UL (ref 1.2–4.8)
LYMPHOCYTES NFR BLD AUTO: 36.7 %
MCH RBC QN AUTO: 32.9 PG (ref 26–34)
MCHC RBC AUTO-ENTMCNC: 33.1 G/DL (ref 32–36)
MCV RBC AUTO: 100 FL (ref 80–100)
MONOCYTES # BLD AUTO: 0.34 X10*3/UL (ref 0.1–1)
MONOCYTES NFR BLD AUTO: 8.1 %
NEUTROPHILS # BLD AUTO: 2.07 X10*3/UL (ref 1.2–7.7)
NEUTROPHILS NFR BLD AUTO: 49 %
NRBC BLD-RTO: 0 /100 WBCS (ref 0–0)
PLATELET # BLD AUTO: 252 X10*3/UL (ref 150–450)
POTASSIUM SERPL-SCNC: 4.2 MMOL/L (ref 3.5–5.3)
RBC # BLD AUTO: 3.77 X10*6/UL (ref 4–5.2)
RH FACTOR (ANTIGEN D): NORMAL
SODIUM SERPL-SCNC: 137 MMOL/L (ref 136–145)
WBC # BLD AUTO: 4.2 X10*3/UL (ref 4.4–11.3)

## 2025-07-30 PROCEDURE — 80048 BASIC METABOLIC PNL TOTAL CA: CPT

## 2025-07-30 PROCEDURE — 86901 BLOOD TYPING SEROLOGIC RH(D): CPT

## 2025-07-30 PROCEDURE — 86850 RBC ANTIBODY SCREEN: CPT

## 2025-07-30 PROCEDURE — 83036 HEMOGLOBIN GLYCOSYLATED A1C: CPT

## 2025-07-30 PROCEDURE — 86900 BLOOD TYPING SEROLOGIC ABO: CPT

## 2025-07-30 PROCEDURE — 85025 COMPLETE CBC W/AUTO DIFF WBC: CPT

## 2025-07-30 PROCEDURE — 93005 ELECTROCARDIOGRAM TRACING: CPT | Performed by: NURSE PRACTITIONER

## 2025-07-30 PROCEDURE — 87081 CULTURE SCREEN ONLY: CPT | Mod: AHULAB | Performed by: NURSE PRACTITIONER

## 2025-07-30 RX ORDER — CHLORHEXIDINE GLUCONATE ORAL RINSE 1.2 MG/ML
SOLUTION DENTAL
Qty: 473 ML | Refills: 0 | Status: SHIPPED | OUTPATIENT
Start: 2025-07-30

## 2025-07-30 RX ORDER — FERROUS SULFATE 325(65) MG
325 TABLET, DELAYED RELEASE (ENTERIC COATED) ORAL
COMMUNITY

## 2025-07-30 RX ORDER — BISMUTH SUBSALICYLATE 262 MG
1 TABLET,CHEWABLE ORAL DAILY
COMMUNITY

## 2025-07-30 RX ORDER — FOLIC ACID 1 MG/1
TABLET ORAL DAILY
COMMUNITY

## 2025-07-30 ASSESSMENT — ENCOUNTER SYMPTOMS
NECK NEGATIVE: 1
ENDOCRINE NEGATIVE: 1
ARTHRALGIAS: 1
CONSTITUTIONAL NEGATIVE: 1
LIMITED RANGE OF MOTION: 1
GASTROINTESTINAL NEGATIVE: 1
RESPIRATORY NEGATIVE: 1
CARDIOVASCULAR NEGATIVE: 1
NEUROLOGICAL NEGATIVE: 1

## 2025-07-30 NOTE — CPM/PAT NURSE NOTE
CPM/PAT Nurse Note      Name: Jo-Ann Lugo (Jo-Ann Lugo)  /Age: 1960/65 y.o.       Medical History[1]    Surgical History[2]    Patient Sexual activity questions deferred to the physician.    Family History[3]    Allergies[4]    Prior to Admission medications   Medication Sig Start Date End Date Taking? Authorizing Provider   atovaquone/proguanil HCl (MALARONE ORAL) Take by mouth once daily.   Yes Historical Provider, MD   ferrous sulfate 325 (65 Fe) mg EC tablet Take 1 tablet by mouth once daily with breakfast. Do not crush, chew, or split.   Yes Historical Provider, MD   folic acid (Folvite) 1 mg tablet Take by mouth once daily.   Yes Historical Provider, MD   hydroCHLOROthiazide (HYDRODiuril) 12.5 mg tablet Take 1 tablet (12.5 mg) by mouth once daily. 23  Yes Historical Provider, MD   lisinopril 20 mg tablet Take 1 tablet (20 mg) by mouth once daily. 23  Yes Historical Provider, MD   multivitamin tablet Take 1 tablet by mouth once daily.   Yes Historical Provider, MD   chlorhexidine (Peridex) 0.12 % solution SWISH AND SPIT 15ML FOR 30 SECONDS NIGHT PRIOR TO SURGERY AND MORNING OF SURGERY 25   ANGELO Avina-CNP        PAT ROS     DASI Risk Score      Flowsheet Row Questionnaire Series Submission from 2025 in [facility] OR with Generic Provider Joe   Can you take care of yourself (eat, dress, bathe, or use toilet)?  2.75  filed at 2025 0757   Can you walk indoors, such as around your house? 1.75  filed at 2025 0757   Can you walk a block or two on level ground?  2.75  filed at 2025 0757   Can you climb a flight of stairs or walk up a hill? 5.5  filed at 2025 0757   Can you run a short distance? 8  filed at 2025 0757   Can you do light work around the house like dusting or washing dishes? 2.7  filed at 2025 0757   Can you do moderate work around the house like vacuuming, sweeping floors or carrying groceries? 3.5  filed at  06/05/2025 0757   Can you do heavy work around the house like scrubbing floors or lifting and moving heavy furniture?  0  filed at 06/05/2025 0757   Can you do yard work like raking leaves, weeding or pushing a mower? 0  filed at 06/05/2025 0757   Can you have sexual relations? 5.25  filed at 06/05/2025 0757   Can you participate in moderate recreational activities like golf, bowling, dancing, doubles tennis or throwing a baseball or football? 0  filed at 06/05/2025 0757   Can you participate in strenous sports like swimming, singles tennis, football, basketball, or skiing? 0  filed at 06/05/2025 0757   DASI SCORE 32.2  filed at 06/05/2025 0757   METS Score (Will be calculated only when all the questions are answered) 6.7  filed at 06/05/2025 0757     Caprini DVT Assessment    No data to display  Modified Frailty Index    No data to display  [id number] Stroke Risk Points  Current as of just now        N/A 0 to 9 Points:      Last Change: N/A          The RXR7GL5-TSYu risk score (Lip GH, et al. 2009. © 2010 American College [employer]) quantifies the risk of stroke for a patient with atrial fibrillation. For patients without atrial fibrillation or under the age of 18 this score appears as N/A. Higher score values generally indicate higher risk of stroke.        This score is not applicable to this patient. Components are not calculated.          Revised Cardiac Risk Index    No data to display  Apfel Simplified Score    No data to display  Risk Analysis Index Results This Encounter    No data found in the last 10 encounters.       Stop Bang Score      Flowsheet Row Questionnaire Series Submission from 6/5/2025 in [facility] OR with Generic Provider Joe   Do you snore loudly? 0  filed at 06/05/2025 0757   Do you often feel tired or fatigued after your sleep? 0  filed at 06/05/2025 0757   Has anyone ever observed you stop breathing in your sleep? 0  filed at 06/05/2025 0757   Do you have or  are you being treated for high blood pressure? 1  filed at 06/05/2025 0757   Recent BMI (Calculated) 30.1  filed at 06/05/2025 0757   Is BMI greater than 35 kg/m2? 0=No  filed at 06/05/2025 0757   Age older than 50 years old? 1=Yes  filed at 06/05/2025 0757   Gender - Male 0=No  filed at 06/05/2025 0757     Prodigy: High Risk  Total Score: 8              Prodigy Age Score           ARISCAT Score for Postoperative Pulmonary Complications    No data to display  Telles Perioperative Risk for Myocardial Infarction or Cardiac Arrest (CATY)    No data to display      Nurse Plan of Action:   After Visit Summary (AVS) reviewed and patient verbalized good understanding of medications and NPO instructions.  Pre-op infection prevention measures:  CHG showers and mouthwash reviewed, understanding voiced.  CHG soap given and patient verbalized need to pick CHG mouthwash at their preferred local pharmacy.                [1]   Past Medical History:  Diagnosis Date    Anxiety     Hypertension     Insomnia     Primary osteoarthritis of right hip     Plan: Right Hip Total Arthroplasty ~ Anterior Approach 8/7/25   [2]   Past Surgical History:  Procedure Laterality Date    ABDOMINAL SURGERY      ANKLE HARDWARE REMOVAL  2007    ANKLE SURGERY      COLON SURGERY      OVARIAN CYST REMOVAL  2002   [3]   Family History  Problem Relation Name Age of Onset    Aneurysm Mother      Diabetes Mother      Hypertension Father      Cancer Sister          Cervical Cancer    Heart disease Brother      No Known Problems Brother     [4]   Allergies  Allergen Reactions    Sulfa (Sulfonamide Antibiotics) Rash

## 2025-07-30 NOTE — CPM/PAT H&P
[facility]/PAT Evaluation       Name: Jo-Ann Lugo (Jo-Ann Lugo)  /Age: 1960/65 y.o.     In-Person         Date of Consult: 25    Referring Provider: Dr. Morgan     Date, Surgery, and Length: 25, right hip total arthroplasty anterior approach, 180 minutes      Patient presents to [facility] for perioperative risk assessment prior to scheduled surgery. Patient presents with end stage arthritis of the right hip and has failed conservative non-operative treatment. She will proceed with right hip total replacement.    This note was created in part upon personal review of patient's medical records.    Patient states she has had high pain medication tolerance with past admissions/surgery    Pt denies any past history of anesthetic complications such as PONV, awareness, prolonged sedation, dental damage, aspiration, cardiac arrest, difficult intubation, difficult I.V. access or unexpected hospital admissions. No history of malignant hyperthermia and or pseudocholinesterase deficiency.    No history of blood transfusions.    The patient IS NOT a Jew and will accept blood and blood products if medically indicated.     Type and screen WAS sent.    Past Medical History:   Diagnosis Date    Anxiety     Hypertension     Insomnia     Primary osteoarthritis of right hip     Plan: Right Hip Total Arthroplasty ~ Anterior Approach 25       Past Surgical History:   Procedure Laterality Date    ABDOMINAL SURGERY      ANKLE HARDWARE REMOVAL      ANKLE SURGERY      COLON SURGERY      OVARIAN CYST REMOVAL         Family History   Problem Relation Name Age of Onset    Aneurysm Mother      Diabetes Mother      Hypertension Father      Cancer Sister          Cervical Cancer       Allergies   Allergen Reactions    Sulfa (Sulfonamide Antibiotics) Rash     Social History     Tobacco Use   Smoking Status Former    Types: Cigarettes   Smokeless Tobacco Never     Social History     Substance and  "Sexual Activity   Alcohol Use Yes    Alcohol/week: 9.0 standard drinks of alcohol    Types: 9 Standard drinks or equivalent per week     Social History     Substance and Sexual Activity   Drug Use Yes    Types: Marijuana    Comment: CBD w/ low THC       Current Outpatient Medications   Medication Instructions    atovaquone/proguanil HCl (MALARONE ORAL) Daily    chlorhexidine (Peridex) 0.12 % solution SWISH AND SPIT 15ML FOR 30 SECONDS NIGHT PRIOR TO SURGERY AND MORNING OF SURGERY    ferrous sulfate 325 mg, Daily with breakfast    folic acid (Folvite) 1 mg tablet Daily    hydroCHLOROthiazide (MICROZIDE) 12.5 mg, Daily    lisinopril 20 mg, Daily    multivitamin tablet 1 tablet, Daily     PAT ROS:   Constitutional:   neg    Neuro/Psych:   neg    Eyes:    use of corrective lenses  Ears:   neg    Nose:   neg    Mouth:   neg    Throat:   neg    Neck:   neg    Cardio:   neg    Respiratory:   neg    Endocrine:   neg    GI:   neg    :   neg     Polyuria: .dcv.   Penile pain: .ckd.  Musculoskeletal:    arthralgias   decreased ROM  Hematologic:   neg    Skin:  neg        Physical Exam  Vitals reviewed. Physical exam within normal limits.        PAT AIRWAY:   Airway:     Mallampati::  II    Neck ROM::  Full      Visit Vitals  /87   Pulse 78   Temp 36.6 °C (97.9 °F)   Resp 16   Ht 1.585 m (5' 2.4\")   Wt 72.1 kg (158 lb 15.2 oz)   SpO2 98%   BMI 28.70 kg/m²   Smoking Status Former   BSA 1.78 m²     Assessment and Plan:       Patient is 65 year old female scheduled for right hip total arthroplasty anterior approach with Dr. Morgan on 8/7/25.    Plan    Cardiovascular:    RCRI: 0  Risk of Mace: 0.5%    Caprini: 7 VTE risk: 10%    Patient denies any chest pain, tightness, heaviness, pressure, radiating pain, palpitations, irregular heartbeats, lightheadedness, cough, congestion, shortness of breath, PEREZ, PND, near syncope, weight loss or gain.    Good functional capacity  Functional 4 Mets. Patient denies SOB walking up 2 " flights of stairs      EKG in PAT obtained on 7/30/25      HTN- will hold Lisinopril 24 hours prior to surgery and will continue hydrochlorothiazide    Pulmonary:  No pulmonary diagnosis, however patient is at increased risk of perioperative complications secondary to  age > 60, obesity  Stop Bang score is 2 placing patient at low risk for YONIS  ARISCAT: 26-44 points, 13.3% risk of in-hospital postoperative pulmonary complication  PRODIGY: Moderate risk for opioid induced respiratory depression    Renal/endo:  Recommendations to avoid nephrotoxic drugs and carefully monitor fluid status to maintain euvolemia. Use dose adjusted medications as needed for the underlying level of renal function.    Heme:  Patient instructed to ambulate as soon as possible postoperatively to decrease thromboembolic risk.    Initiate mechanical DVT prophylaxis as soon as possible and initiate chemical prophylaxis when deemed safe from a bleeding standpoint post surgery.      Risk assessment complete.  This patient is LOW risk candidate undergoing MODERATE risk procedure, patient is medically optimized for surgery.      Labs/testing obtained in PAT on 7/30/25: CBC, BMP, T&S, A1C, MRSA, EKG    Lab Results   Component Value Date    WBC 4.2 (L) 07/30/2025    HGB 12.4 07/30/2025    HCT 37.5 07/30/2025     07/30/2025     07/30/2025     Lab Results   Component Value Date    GLUCOSE 88 07/30/2025    CALCIUM 9.0 07/30/2025     07/30/2025    K 4.2 07/30/2025    CO2 24 07/30/2025     07/30/2025    BUN 20 07/30/2025    CREATININE 0.95 07/30/2025     Lab Results   Component Value Date    HGBA1C 5.1 07/30/2025       omponent 11:31   ABO TYPE A   Rh TYPE POS   ANTIBODY SCREEN NEG   Resulting Agency [facility]             Specimen Collected: 07/30/25 11:31 Last Resulted: 07/30/25     Follow up/communication: none      Preoperative medication instructions were provided and reviewed with the patient.  Any additional testing or evaluation  was explained to the patient.  Nothing by mouth instructions were discussed and patient's questions were answered prior to conclusion to this encounter.  Patient verbalized understanding of preoperative instructions given in preadmission testing; discharge instructions available in EMR.    This note was dictated with speech recognition.  Minor errors may have been detected during use of speech recognition.

## 2025-07-30 NOTE — H&P (VIEW-ONLY)
[facility]/PAT Evaluation       Name: Jo-Ann Lugo (Jo-Ann Lugo)  /Age: 1960/65 y.o.     In-Person         Date of Consult: 25    Referring Provider: Dr. Morgan     Date, Surgery, and Length: 25, right hip total arthroplasty anterior approach, 180 minutes      Patient presents to [facility] for perioperative risk assessment prior to scheduled surgery. Patient presents with end stage arthritis of the right hip and has failed conservative non-operative treatment. She will proceed with right hip total replacement.    This note was created in part upon personal review of patient's medical records.    Patient states she has had high pain medication tolerance with past admissions/surgery    Pt denies any past history of anesthetic complications such as PONV, awareness, prolonged sedation, dental damage, aspiration, cardiac arrest, difficult intubation, difficult I.V. access or unexpected hospital admissions. No history of malignant hyperthermia and or pseudocholinesterase deficiency.    No history of blood transfusions.    The patient IS NOT a Sikhism and will accept blood and blood products if medically indicated.     Type and screen WAS sent.    Past Medical History:   Diagnosis Date    Anxiety     Hypertension     Insomnia     Primary osteoarthritis of right hip     Plan: Right Hip Total Arthroplasty ~ Anterior Approach 25       Past Surgical History:   Procedure Laterality Date    ABDOMINAL SURGERY      ANKLE HARDWARE REMOVAL      ANKLE SURGERY      COLON SURGERY      OVARIAN CYST REMOVAL         Family History   Problem Relation Name Age of Onset    Aneurysm Mother      Diabetes Mother      Hypertension Father      Cancer Sister          Cervical Cancer       Allergies   Allergen Reactions    Sulfa (Sulfonamide Antibiotics) Rash     Social History     Tobacco Use   Smoking Status Former    Types: Cigarettes   Smokeless Tobacco Never     Social History     Substance and  "Sexual Activity   Alcohol Use Yes    Alcohol/week: 9.0 standard drinks of alcohol    Types: 9 Standard drinks or equivalent per week     Social History     Substance and Sexual Activity   Drug Use Yes    Types: Marijuana    Comment: CBD w/ low THC       Current Outpatient Medications   Medication Instructions    atovaquone/proguanil HCl (MALARONE ORAL) Daily    chlorhexidine (Peridex) 0.12 % solution SWISH AND SPIT 15ML FOR 30 SECONDS NIGHT PRIOR TO SURGERY AND MORNING OF SURGERY    ferrous sulfate 325 mg, Daily with breakfast    folic acid (Folvite) 1 mg tablet Daily    hydroCHLOROthiazide (MICROZIDE) 12.5 mg, Daily    lisinopril 20 mg, Daily    multivitamin tablet 1 tablet, Daily     PAT ROS:   Constitutional:   neg    Neuro/Psych:   neg    Eyes:    use of corrective lenses  Ears:   neg    Nose:   neg    Mouth:   neg    Throat:   neg    Neck:   neg    Cardio:   neg    Respiratory:   neg    Endocrine:   neg    GI:   neg    :   neg     Polyuria: .dcv.   Penile pain: .ckd.  Musculoskeletal:    arthralgias   decreased ROM  Hematologic:   neg    Skin:  neg        Physical Exam  Vitals reviewed. Physical exam within normal limits.        PAT AIRWAY:   Airway:     Mallampati::  II    Neck ROM::  Full      Visit Vitals  /87   Pulse 78   Temp 36.6 °C (97.9 °F)   Resp 16   Ht 1.585 m (5' 2.4\")   Wt 72.1 kg (158 lb 15.2 oz)   SpO2 98%   BMI 28.70 kg/m²   Smoking Status Former   BSA 1.78 m²     Assessment and Plan:       Patient is 65 year old female scheduled for right hip total arthroplasty anterior approach with Dr. Morgan on 8/7/25.    Plan    Cardiovascular:    RCRI: 0  Risk of Mace: 0.5%    Caprini: 7 VTE risk: 10%    Patient denies any chest pain, tightness, heaviness, pressure, radiating pain, palpitations, irregular heartbeats, lightheadedness, cough, congestion, shortness of breath, PEREZ, PND, near syncope, weight loss or gain.    Good functional capacity  Functional 4 Mets. Patient denies SOB walking up 2 " flights of stairs      EKG in PAT obtained on 7/30/25      HTN- will hold Lisinopril 24 hours prior to surgery and will continue hydrochlorothiazide    Pulmonary:  No pulmonary diagnosis, however patient is at increased risk of perioperative complications secondary to  age > 60, obesity  Stop Bang score is 2 placing patient at low risk for YONIS  ARISCAT: 26-44 points, 13.3% risk of in-hospital postoperative pulmonary complication  PRODIGY: Moderate risk for opioid induced respiratory depression    Renal/endo:  Recommendations to avoid nephrotoxic drugs and carefully monitor fluid status to maintain euvolemia. Use dose adjusted medications as needed for the underlying level of renal function.    Heme:  Patient instructed to ambulate as soon as possible postoperatively to decrease thromboembolic risk.    Initiate mechanical DVT prophylaxis as soon as possible and initiate chemical prophylaxis when deemed safe from a bleeding standpoint post surgery.      Risk assessment complete.  This patient is LOW risk candidate undergoing MODERATE risk procedure, patient is medically optimized for surgery.      Labs/testing obtained in PAT on 7/30/25: CBC, BMP, T&S, A1C, MRSA, EKG    Lab Results   Component Value Date    WBC 4.2 (L) 07/30/2025    HGB 12.4 07/30/2025    HCT 37.5 07/30/2025     07/30/2025     07/30/2025     Lab Results   Component Value Date    GLUCOSE 88 07/30/2025    CALCIUM 9.0 07/30/2025     07/30/2025    K 4.2 07/30/2025    CO2 24 07/30/2025     07/30/2025    BUN 20 07/30/2025    CREATININE 0.95 07/30/2025     Lab Results   Component Value Date    HGBA1C 5.1 07/30/2025       omponent 11:31   ABO TYPE A   Rh TYPE POS   ANTIBODY SCREEN NEG   Resulting Agency [facility]             Specimen Collected: 07/30/25 11:31 Last Resulted: 07/30/25     Follow up/communication: none      Preoperative medication instructions were provided and reviewed with the patient.  Any additional testing or evaluation  was explained to the patient.  Nothing by mouth instructions were discussed and patient's questions were answered prior to conclusion to this encounter.  Patient verbalized understanding of preoperative instructions given in preadmission testing; discharge instructions available in EMR.    This note was dictated with speech recognition.  Minor errors may have been detected during use of speech recognition.

## 2025-07-30 NOTE — PREPROCEDURE INSTRUCTIONS
Medication List            Accurate as of July 30, 2025 10:47 AM. Always use your most recent med list.                chlorhexidine 0.12 % solution  Commonly known as: Peridex  SWISH AND SPIT 15ML FOR 30 SECONDS NIGHT PRIOR TO SURGERY AND MORNING OF SURGERY  Medication Adjustments for Surgery: Take/Use as prescribed     ferrous sulfate 325 (65 Fe) mg EC tablet  Additional Medication Adjustments for Surgery: Take last dose 7 days before surgery     folic acid 1 mg tablet  Commonly known as: Folvite  Additional Medication Adjustments for Surgery: Take last dose 7 days before surgery     hydroCHLOROthiazide 12.5 mg tablet  Commonly known as: Microzide  Medication Adjustments for Surgery: Take/Use as prescribed     lisinopril 20 mg tablet  Medication Adjustments for Surgery: Take last dose 1 day (24 hours) before surgery  Notes to patient: Do NOT take evening before surgery and do NOT take morning of surgery.     MALARONE ORAL  Medication Adjustments for Surgery: Take/Use as prescribed     multivitamin tablet  Additional Medication Adjustments for Surgery: Take last dose 7 days before surgery                Preoperative Deep Breathing Exercises  Why it is important to do deep breathing exercises before my surgery?  Deep breathing exercises strengthen your breathing muscles.  This helps you to recover after your surgery and decreases the chance of breathing complications.  How are the deep breathing exercises done?  Sit straight with your back supported.  Breathe in deeply and slowly through your nose. Your lower rib cage should expand and your abdomen may move forward.  Hold that breath for 3 to 5 seconds.  Breathe out through pursed lips, slowly and completely.  Rest and repeat 10 times every hour while awake.  Rest longer if you become dizzy or lightheaded.        CONTACT SURGEON'S OFFICE IF YOU DEVELOP:  * Fever = 100.4 F   * New respiratory symptoms (e.g. cough, shortness of breath, respiratory distress, sore  throat)  * Recent loss of taste or smell  *Flu like symptoms such as headache, fatigue or gastrointestinal symptoms  * You develop any open sores, shingles, burning or painful urination   AND/OR:  * You no longer wish to have the surgery.  * Any other personal circumstances change that may lead to the need to cancel or defer this surgery.  *You were admitted to any hospital within one week of your planned procedure.    SMOKING:  *Quitting smoking can make a huge difference to your health and recovery from surgery.    *If you need help with quitting, call 3-942-QUIT-NOW.    THE DAY OF SURGERY:  *Do not eat any food after midnight the night before your surgery.   *YOU MUST drink 14 OUNCES of clear liquids TWO hours before your instructed ARRIVAL TIME to the hospital. This includes water, black tea/coffee (no milk or cream), apple juice, clear broth and electrolyte drinks (Gatorade).  Please avoid clear liquids that are red in color.   *You may chew gum/mints up to TWO hours before your surgery/procedure.    SURGICAL TIME:  *You will be contacted between 2 p.m. and 6 p.m. the business day before your surgery with your arrival time.  *If you haven't received a call by 6pm, call [phone].  *Scheduled surgery times may change and you will be notified if this occurs-check your personal voicemail for any updates.    ON THE MORNING OF SURGERY:  *Wear comfortable, loose fitting clothing.   *Do not use moisturizers, creams, lotions or perfume.  *All jewelry and valuables should be left at home.  *Prosthetic devices such as contact lenses, hearing aids, dentures, eyelash extensions, hairpins and body piercing must be removed before surgery.    BRING WITH YOU:  *Photo ID and insurance card  *Current list of medications and allergies  *Pacemaker/Defibrillator/Heart stent cards  *CPAP machine and mask  *Slings/splints/crutches  *Copy of your complete Advanced Directive/DHPOA-if applicable  *Neurostimulator implant  remote    PARKING AND ARRIVAL:  *Check in at the Main Entrance desk and let them know you are here for surgery.  *You will be directed to the 2nd floor surgical waiting area.    IF YOU ARE HAVING OUTPATIENT/SAME DAY SURGERY:  *A responsible adult MUST accompany you at the time of discharge and stay with you for 24 hours after your surgery.  *You may NOT drive yourself home after surgery.  *You may use a taxi or ride sharing service ([employer], [employer]) to return home ONLY if you are accompanied by a friend or family member.  *Instructions for resuming your medications will be provided by your surgeon.      Home Preoperative Antibacterial Shower     What is a home preoperative antibacterial shower?  This shower is a way of cleaning the skin with a germ killing soap before surgery.  The soap contains chlorhexidine, commonly known as CHG.  CHG is a soap for your skin with germ killing ability.  Let your doctor know if you are allergic to chlorhexidine.    Why do I need to take a preoperative antibacterial shower?  Skin is not sterile.  It is best to try to make your skin as free of germs as possible before surgery.  Proper cleansing with a germ killing soap before surgery can lower the number of germs on your skin.  This helps to reduce the risk of infection at the surgical site.  Following the instructions listed below will help you prepare your skin for surgery.      How do I use the CHG skin cleanser?  Steps:  Begin using your CHG soap five days before your scheduled surgery on ________________________.    Days 1-4 Shower before bed:  Wash your face and genitals with your normal soap and rinse.           2.    Apply the CHG soap to a clean wet washcloth.  Turn the water off or move away                From the water spray to avoid premature rinsing of the CHG soap as you are applying.     3.   Lather your entire body from the neck down.  Do not use on your face or genitals.  4. Pay special attention to the area(s) where  your incision(s) will be located unless they are on your face.  Avoid scrubbing your skin too hard.  The important point is to have the CHG soap sit on your skin for 3 minutes.    When the 3 minutes are up, turn on the water and rinse the CHG soap off your body completely.   Pat yourself dry with a clean, freshly-laundered towel.  Dress in clean, freshly laundered night clothes.    Be sure to change bed sheets and blankets at least on the first night of CHG body wash use. May change linens every night of the above protocol for maximum benefit.   Day 5:  Last shower is the morning of surgery: Follow above Instructions.    NOTE:        *Keep CHG soap out of eyes and ear canals   *DO NOT wash with regular soap on your body after you have used the CHG soap solution  *DO NOT apply powders, lotions, or perfume.  *Deodorant may be used days 1-4, BUT NOT the day of surgery    Who should I contact if I have any questions regarding the use of CHG soap?  Call the [facility], Preadmission Testing at [phone] if you have any questions.              Patient Information: Pre-Operative Infection Prevention Measures     Why did I have my nose, under my arms and groin swabbed?  The purpose of the swab is to identify Staphylococcus aureus inside your nose or on your skin.  The swab was sent to the laboratory for culture.  A positive swab/culture for Staphylococcus aureus is called colonization or carriage.      What is Staphylococcus aureus?  Staphylococcus aureus, also known as “staph”, is a germ found on the skin or in the nose of healthy people.  Sometimes Staphylococcus aureus can get into the body and cause an infection.  This can be minor (such as pimples, boils or other skin problems).  It might also be serious (such as blood infection, pneumonia or a surgical site infection).    What is Staphylococcus aureus colonization or carriage?  Colonization or carriage means that a person has the germ  but is not sick from it.  These bacteria can be spread on the hands or when breathing or sneezing.    How is Staphylococcus aureus spread?  It is most often spread by close contact with a person or item that carries it.    What happens if my culture is positive for Staphylococcus aureus?  Your doctor/medical team will use this information to guide any antibiotic treatment which may be necessary.  Regardless of the culture results, we will clean the inside of your nose with a betadine swab just before you have your surgery.      Will I get an infection if I have Staphylococcus aureus in my nose or on my skin?  Anyone can get an infection with Staphylococcus aureus.  However, the best way to reduce your risk of infection is to follow the instructions provided to you for the use of your CHG soap and dental rinse.        Who should I contact if I have any questions?  Call the [facility], Preadmission Testing at [phone] if you have any questions.          Patient Information: Oral/Dental Rinse  **This is a prescription; pick it up at your preferred local pharmacy **  What is oral/dental rinse?   It is a mouthwash. It is a way of cleaning the mouth with a germ killing solution before your surgery.  The solution contains chlorhexidine, commonly known as CHG.   It is used inside the mouth to kill a bacteria known as Staphylococcus aureus.  Let your doctor know if you are allergic to Chlorhexidine.    Why do I need to use CHG oral/dental rinse?  The CHG oral/dental rinse helps to kill a bacteria in your mouth known a Staphylococcus aureus.     This reduces the risk of infection at the surgical site.      Using your CHG oral/dental rinse  STEPS:  Use your CHG oral/dental rinse after you brush your teeth the night before (at bedtime) and the morning of your surgery.  Follow all directions on your prescription label.    Use the cap on the container to measure 15ml (fill cap to fill  line)  Swish (gargle if you can) the mouthwash in your mouth for at least 30 seconds, (do not to swallow) spit out  After you use your CHG rinse, do not rinse your mouth with water, drink or eat.  Please refer to prescription label for the appropriate time to resume oral intake  Dental rinse comes in one size bottle: 473ml ~16oz.  You will have leftover    rinse, discard after this use.    What side effects might I have using the CHG oral/dental rinse?  CHG rinse will stick to plaque on the teeth.  Brush and floss just before use.  Teeth brushing will help avoid staining of plaque during use.    Who should I contact if I have questions about the CHG oral/dental rinse?  Please call [facility], Preadmission Testing at [phone] if you have any questions

## 2025-07-31 LAB
ATRIAL RATE: 65 BPM
P AXIS: 34 DEGREES
P OFFSET: 199 MS
P ONSET: 166 MS
PR INTERVAL: 120 MS
Q ONSET: 226 MS
QRS COUNT: 11 BEATS
QRS DURATION: 92 MS
QT INTERVAL: 398 MS
QTC CALCULATION(BAZETT): 413 MS
QTC FREDERICIA: 408 MS
R AXIS: -9 DEGREES
T AXIS: 50 DEGREES
T OFFSET: 425 MS
VENTRICULAR RATE: 65 BPM

## 2025-08-01 LAB — STAPHYLOCOCCUS SPEC CULT: NORMAL

## 2025-08-05 ENCOUNTER — HOME HEALTH ADMISSION (OUTPATIENT)
Dept: HOME HEALTH SERVICES | Facility: HOME HEALTH | Age: 65
End: 2025-08-05
Payer: MEDICARE

## 2025-08-05 PROBLEM — M16.11 ARTHRITIS OF RIGHT HIP: Status: ACTIVE | Noted: 2025-08-05

## 2025-08-05 RX ORDER — DOCUSATE SODIUM 100 MG/1
100 CAPSULE, LIQUID FILLED ORAL 2 TIMES DAILY
Qty: 30 CAPSULE | Refills: 0 | Status: SHIPPED | OUTPATIENT
Start: 2025-08-05 | End: 2025-08-22

## 2025-08-05 RX ORDER — ACETAMINOPHEN 500 MG
1000 TABLET ORAL EVERY 8 HOURS
Qty: 360 TABLET | Refills: 0 | Status: SHIPPED | OUTPATIENT
Start: 2025-08-05 | End: 2025-10-04

## 2025-08-05 RX ORDER — POLYETHYLENE GLYCOL 3350 17 G/17G
17 POWDER, FOR SOLUTION ORAL DAILY
Qty: 238 G | Refills: 0 | Status: SHIPPED | OUTPATIENT
Start: 2025-08-05

## 2025-08-05 RX ORDER — OXYCODONE HYDROCHLORIDE 5 MG/1
5-10 TABLET ORAL EVERY 6 HOURS PRN
Qty: 40 TABLET | Refills: 0 | Status: SHIPPED | OUTPATIENT
Start: 2025-08-05 | End: 2025-08-14

## 2025-08-05 RX ORDER — ONDANSETRON 4 MG/1
4 TABLET, FILM COATED ORAL EVERY 8 HOURS PRN
Qty: 20 TABLET | Refills: 0 | Status: SHIPPED | OUTPATIENT
Start: 2025-08-05

## 2025-08-05 RX ORDER — NAPROXEN SODIUM 220 MG/1
81 TABLET, FILM COATED ORAL 2 TIMES DAILY
Qty: 84 TABLET | Refills: 0 | Status: SHIPPED | OUTPATIENT
Start: 2025-08-05 | End: 2025-09-18

## 2025-08-05 RX ORDER — MELOXICAM 15 MG/1
15 TABLET ORAL DAILY
Qty: 30 TABLET | Refills: 1 | Status: SHIPPED | OUTPATIENT
Start: 2025-08-05 | End: 2025-10-04

## 2025-08-05 RX ORDER — TRAMADOL HYDROCHLORIDE 50 MG/1
50-100 TABLET, FILM COATED ORAL EVERY 6 HOURS PRN
Qty: 40 TABLET | Refills: 0 | Status: SHIPPED | OUTPATIENT
Start: 2025-08-05 | End: 2025-08-14

## 2025-08-05 RX ORDER — PANTOPRAZOLE SODIUM 40 MG/1
40 TABLET, DELAYED RELEASE ORAL
Qty: 30 TABLET | Refills: 0 | Status: SHIPPED | OUTPATIENT
Start: 2025-08-05 | End: 2025-09-06

## 2025-08-05 NOTE — DISCHARGE INSTRUCTIONS
Total Hip Arthroplasty   Postoperative Instructions    CONTACT INFORMATION  Abby Morgan MD  Joint Replacement Surgeon   Kat Calvillo     [phone]     Sadie  Orth[facility] Coordinators [place]  [phone]    Scott Garland RN, BSN  Ortho Coordinator [facility]  [phone]    Luzmaria SILVEIRAN-BC  Ortho Nurse Navigator  [phone]    DRIVING AFTER SURGERY   Please discuss post-surgical, long-distance travel with your surgeon. You may not drive until you are off narcotics and cleared by your surgical team.     WOUND CARE   A waterproof dressing was placed over your surgical site. You may shower over this dressing after surgery and pat it dry. Please do not scrub, soak, or submerge your surgical site for at least three weeks after surgery to allow your incision to heal. Avoid using creams and ointments around your surgical site while it is healing.    Your Mepilex dressing will be removed on post-operative day 7 by your physical therapist. If you have a Prevena VAC dressing (purple sponge) dressing, this will stay on for 1-2 weeks before removal by your physical therapist or surgeon. The incision may be left open to air after the bandage has been removed. Please do not submerge your incision in a hot tub/pool/lake/etc. until cleared by your surgeon. Please contact the office if there are any concerns with your wound. You may have a mesh dressing (prineo) underneath your Mepilex dressing.  Leave this in place until your follow-up visit with your surgeon.  You may shower over this dressing.    Pain, swelling, and bruising are normal after total hip replacement surgery. You may alleviate these symptoms by following the post-operative pain regimen that was prescribed, icing your surgical site multiple times a day, and elevating your extremity throughout the day.     ACTIVITY AND HIP PRECAUTIONS  Please follow the post-operative activity and hip precautions that were  described to you by your surgical team and physical therapists.    Weightbearing restriction: weightbearing as tolerated     If you had anterior total hip replacement surgery, please avoid excessive hip extension and external rotation.    If you had posterior total hip replacement surgery, please avoid hip flexion greater than 90 degrees, adduction past midline, and internal rotation beyond neutral.    DISCHARGE MEDICATIONS  Pain  Please take the pain medications that were prescribed to you according to the prescribed schedule. You may not operate a motor vehicle while taking narcotic medications (e.g. Oxycodone, Tramadol).     Please take Tylenol and NSAIDs (if you are able) on a schedule as discussed in clinic. Please take the prescribed Pantoprazole to protect your stomach from ulceration after surgery while taking NSAIDs.     Please wean off the narcotic pain medications as soon as you are able.     Blood-Thinners  Please take the blood thinning medications that were prescribed according to the instructions from your surgeon. These are typically continued for 6 weeks postoperatively. This schedule may differ if you were already on blood-thinning medication prior to your surgery.     Nausea  You may feel nauseous after surgery due to the anesthetics or pain medications you are taking. You may take anti-nausea medication (e.g. Ondansetron) to help with these symptoms.     Stool Softeners   Please take the stool softeners that were prescribed at discharge (e.g. Colace, Miralax) while you are on narcotic pain medications to minimize your risk of constipation.    Home Medications   You may restart your home medications at time of discharge according to your discharge instructions.    PHYSICAL THERAPY AND OCCUPATIONAL THERAPY   In-home physical therapy and occupational therapy will start within a few days of your discharge to home. You will transition to outpatient physical therapy around 2-3 weeks after your surgery.      FOLLOW-UP  You will see your surgical team 4-6 weeks after surgery for X-rays and clinical evaluation.    CALL YOUR SURGEON IF YOU HAVE:   ° Drainage that is not contained by your surgical dressing.  ° Redness, pain or swelling in your calf.   ° Severe pain that is not controlled by the pain regimen prescribed.   ° Fever >101 Fahrenheit presents for at least 48 hours or other signs of infection (wound drainage, redness around your surgical incision, increased joint pain)  ° Go to the emergency department or call 911 if you experience chest pain, shortness of breath or other emergent symptoms. Do not call your surgeon first.     ANTIBIOTIC PROPHYLAXIS AFTER JOINT REPLACEMENT SURGERY   Although it is a rare occurrence, an artificial joint can become infected by the bloodstream carrying infection from another part of the body to the replaced joint.  Therefore, it is important that any bacterial infection be treated promptly. If you are unsure of whether you need to take antibiotics, please call the office before taking any medication.  We advise that you take antibiotics prior to the following invasive procedures for a lifetime. Please wait at least 3-6 months after joint replacement surgery before undergoing dental work or cleaning.    Please take antibiotics one hour before any of the following procedures:  ° Routine dental cleaning or dental procedure  ° Root canal  ° Podiatry procedures that involve cutting into the skin  ° Dermatologic procedures that involve cutting into the skin.  (Not required for laser or freezing of skin)  ° Invasive procedures regarding the urinary tract     Antibiotics are not required for the following procedures:   ° Manicures/Pedicures  ° Colonoscopy/Endoscopy/Sigmoidoscopy  ° Routine gynecologic exams/procedures/PAP smears, D&C´s  ° Cataract/laser eye surgery  ° Injection or blood work  ° Routine colds and flu and minor cuts and bruises    You may have a flu shot at any time  following your surgery.

## 2025-08-06 ENCOUNTER — ANESTHESIA EVENT (OUTPATIENT)
Dept: OPERATING ROOM | Facility: HOSPITAL | Age: 65
End: 2025-08-06
Payer: MEDICARE

## 2025-08-06 PROBLEM — I10 HTN (HYPERTENSION): Status: ACTIVE | Noted: 2025-08-06

## 2025-08-06 PROBLEM — H26.9 CATARACT PRESENT: Status: ACTIVE | Noted: 2025-08-06

## 2025-08-06 PROBLEM — F41.9 ANXIETY: Status: ACTIVE | Noted: 2025-08-06

## 2025-08-06 PROBLEM — D64.9 ANEMIA: Status: ACTIVE | Noted: 2025-08-06

## 2025-08-06 NOTE — ANESTHESIA PREPROCEDURE EVALUATION
Patient: Jo-Ann Lugo    Procedure Information       Date/Time: 08/07/25 0715    Procedure: Right Hip Total Arthroplasty ~ Anterior Approach (Right: Hip)    Location: [facility] A OR 05 / [facility] A OR    Surgeons: Abby Morgan MD            Relevant Problems   Anesthesia  Resistant to anesthesia      Cardiac   (+) HTN (hypertension)      Pulmonary  Hx Covid  Remote smoking      Neuro  insomnia   (+) Anxiety      Hematology   (+) Anemia      Musculoskeletal   (+) Primary osteoarthritis of right hip      HEENT   (+) Cataract present                                                         Pre-Anesthesia Evaluation                                         Jo-Ann Lugo is a 65 y.o. female who presents for the above mentioned procedure due to Primary osteoarthritis of right hip [M16.11]    Medical History[1]  Surgical History[2]  Social History[3]  RX Allergies[4]  Current Medications[5]  Prior to Admission medications   Medication Sig Start Date End Date Taking? Authorizing Provider   acetaminophen (Tylenol) 500 mg tablet Take 2 tablets (1,000 mg) by mouth every 8 hours. 8/5/25 10/4/25  Abby Morgan MD   aspirin 81 mg chewable tablet Chew and swallow 1 tablet (81 mg) 2 times a day. 8/5/25 9/16/25  Abby Morgan MD   atovaquone/proguanil HCl (MALARONE ORAL) Take by mouth once daily.    Historical Provider, MD   chlorhexidine (Peridex) 0.12 % solution SWISH AND SPIT 15ML FOR 30 SECONDS NIGHT PRIOR TO SURGERY AND MORNING OF SURGERY 7/30/25   Allison Ayon APRN-CNP   docusate sodium (Colace) 100 mg capsule Take 1 capsule (100 mg) by mouth 2 times a day for 15 days. 8/5/25 8/20/25  Abby Morgan MD   ferrous sulfate 325 (65 Fe) mg EC tablet Take 1 tablet by mouth once daily with breakfast. Do not crush, chew, or split.    Historical Provider, MD   folic acid (Folvite) 1 mg tablet Take by mouth once daily.    Historical Provider, MD   hydroCHLOROthiazide (HYDRODiuril) 12.5 mg tablet Take 1 tablet (12.5  "mg) by mouth once daily. 5/12/23   Historical Provider, MD   lisinopril 20 mg tablet Take 1 tablet (20 mg) by mouth once daily. 5/12/23   Historical Provider, MD   meloxicam (Mobic) 15 mg tablet Take 1 tablet (15 mg) by mouth once daily. 8/5/25 10/4/25  Abby Morgan MD   multivitamin tablet Take 1 tablet by mouth once daily.    Historical Provider, MD   ondansetron (Zofran) 4 mg tablet Take 1 tablet (4 mg) by mouth every 8 hours if needed for nausea or vomiting. 8/5/25   Abby Morgan MD   oxyCODONE (Roxicodone) 5 mg immediate release tablet Take 1-2 tablets (5-10 mg) by mouth every 6 hours if needed for severe pain (7 - 10) for up to 7 days. 8/5/25 8/12/25  Abby Morgan MD   pantoprazole (ProtoNix) 40 mg EC tablet Take 1 tablet (40 mg) by mouth once daily in the morning. Take before meals. Do not crush, chew, or split. 8/5/25 9/4/25  Abby Morgan MD   polyethylene glycol (Glycolax, Miralax) 17 gram packet Take 17 g by mouth once daily for 14 days. **Please fill this medication on the day of surgery with Dr. Morgan.** 8/5/25 8/19/25  Abby Morgan MD   traMADol (Ultram) 50 mg tablet Take 1-2 tablets ( mg) by mouth every 6 hours if needed for severe pain (7 - 10) for up to 7 days. 8/5/25 8/12/25  Abby Morgan MD     No medication comments found.    Visit Vitals  Smoking Status Former                         BP Readings from Last 4 Encounters:   07/30/25 134/87   10/09/23 (!) 128/92      If applicable:  No results found for: \"PREGTESTUR\", \"PREGSERUM\", \"HCG\", \"HCGQUANT\"        Recent Labs     07/30/25  1131   WBC 4.2*   HGB 12.4   HCT 37.5           Recent Labs     07/30/25  1131   EGFR 66   ANIONGAP 15   BUN 20   CREATININE 0.95      K 4.2      CO2 24   GLUCOSE 88   CALCIUM 9.0     Recent Labs     07/30/25  1131   HGBA1C 5.1     No results for input(s): \"BILITOT\", \"PROT\", \"ALBUMIN\", \"ALKPHOS\", \"ALT\", \"AST\", \"LIPASE\", \"AMYLASE\" in the last 79483 hours.  No " "results for input(s): \"PROTIME\", \"INR\" in the last 24973 hours.  Lab Results   Component Value Date    ABO A 07/30/2025    LABRH POS 07/30/2025    ABSCRN NEG 07/30/2025     No results found for: \"FERRITIN\", \"TIBC\", \"IRONSAT\"  Lab Results   Component Value Date    STAPHMRSASCR No Staphylococcus aureus isolated 07/30/2025     No results for input(s): \"AMPHETAMINE\", \"MAMPHBLDS\", \"BARBITURATE\", \"BENZODIAZ\", \"BUPRENBLDS\", \"CANNABBLDS\", \"COCBLDS\", \"METHABLDS\", \"OXYBLDS\", \"PCPBLDS\", \"OPIATBLDS\", \"DRBLDCOMM\" in the last 42396 hours.  No results for input(s): \"PHART\", \"MTU0NIN\", \"PO2ART\", \"SDC0KCC\", \"Q6DNDYLV\", \"BEART\", \"H7BLJXKH\" in the last 05728 hours.    EKG   Encounter Date: 07/30/25   ECG 12 Lead   Result Value    Ventricular Rate 65    Atrial Rate 65    GA Interval 120    QRS Duration 92    QT Interval 398    QTC Calculation(Bazett) 413    P Axis 34    R Axis -9    T Axis 50    QRS Count 11    Q Onset 226    P Onset 166    P Offset 199    T Offset 425    QTC Fredericia 408    Narrative    Normal sinus rhythm  Septal infarct (cited on or before 14-FEB-2016)  Abnormal ECG  When compared with ECG of 15-FEB-2016 06:11,  Vent. rate has decreased BY  54 BPM  Questionable change in initial forces of Septal leads  ST no longer depressed in Anterior leads      Ejection Fractions:No results found for: \"EF\"  Echocardiogram     Stress TestingNo results found for this or any previous visit from the past 49821 days.   No results found for this or any previous visit from the past 365 days.   No results found for this or any previous visit from the past 365 days.     Cardiac Catheterization  No results found for this or any previous visit from the past 365 days.   No results found for this or any previous visit from the past 365 days.   No results found for this or any previous visit from the past 365 days.     Right Heart Cath No results found for this or any previous visit from the past 365 days.    Coronary Artery Calcium Score No " "results found for this or any previous visit from the past 365 days.     AAA screen No results found for this or any previous visit from the past 365 days.     Carotid DopplerNo results found for this or any previous visit from the past 23303 days.      X Ray === 07/28/25 ===    XR LOWER EXTREMITY LEG LENGTH EVALUATION    - Impression -  Advanced osteoarthritis right hip.    Signed by: Mukul Cordon 7/29/2025 7:10 AM  Dictation workstation:   [id number]  Pulmonary Function Tests   No results found for this or any previous visit from the past 365 days.    No results found for: \"FEV1\", \"FVC\", \"SFS1VYZ\", \"TLC\", \"DLCO\"   OTHER: No results found for this or any previous visit from the past 1825 days.    The ASCVD Risk score (Melissa RESENDEZ, et al., 2019) failed to calculate for the following reasons:    Cannot find a previous HDL lab    Cannot find a previous total cholesterol lab    Code Status: Assume Full                   Clinical information reviewed:                   NPO Detail:  No data recorded     Physical Exam    Airway  Mallampati: II     Cardiovascular    Dental    Pulmonary    Abdominal            Anesthesia Plan    History of general anesthesia?: yes  History of complications of general anesthesia?: no    ASA 2     spinal     intravenous induction   Anesthetic plan and risks discussed with patient.             [1]   Past Medical History:  Diagnosis Date    Anemia     Off and on for years.    Anxiety     Sometimes.    Cataract 2023    Had surgery in my right eye about 2-3 years ago.    COVID-19     Had it once, a few years ago.    Fractures 1979    Left ankle, hardware from fall in my 20s.    Hypertension     Been on meds for several years    Insomnia     Pelvic mass     Large cyst and ovary removed several years ago    Primary osteoarthritis of right hip     Plan: Right Hip Total Arthroplasty ~ Anterior Approach 8/7/25   [2]   Past Surgical History:  Procedure Laterality Date    ABDOMINAL SURGERY      ANKLE " FRACTURE SURGERY      Hardware from fall while in my 20s.    ANKLE HARDWARE REMOVAL      ANKLE SURGERY      CATARACT EXTRACTION      Right eye    COLON SURGERY  2016    See above    COLONOSCOPY      Had one years ago, followed by 2 rounds of cologard.    OTHER SURGICAL HISTORY      2016 …. Came in through emergency not knowing what was wrong. Nothing showed up on tests. After a few days, exploratory surgery. Scar tossue weapped around colon, part of colon removed.    OVARIAN CYST REMOVAL     [3]   Social History  Tobacco Use    Smoking status: Former     Current packs/day: 0.00     Average packs/day: 0.5 packs/day for 10.8 years (5.4 ttl pk-yrs)     Types: Cigarettes     Start date: 1976     Quit date: 10/1/1986     Years since quittin.8    Smokeless tobacco: Never    Tobacco comments:     Quit 39 years ago, smoked 1/2  PPD for about 15-20 years    Vaping Use    Vaping status: Never Used   Substance Use Topics    Alcohol use: Yes     Alcohol/week: 5.0 standard drinks of alcohol     Types: 5 Standard drinks or equivalent per week    Drug use: Not Currently     Types: Marijuana     Comment: CBD w/ low THC, no usage in a few months   [4]   Allergies  Allergen Reactions    Sulfa (Sulfonamide Antibiotics) Rash   [5] No current facility-administered medications for this encounter.    Current Outpatient Medications:     acetaminophen (Tylenol) 500 mg tablet, Take 2 tablets (1,000 mg) by mouth every 8 hours., Disp: 360 tablet, Rfl: 0    aspirin 81 mg chewable tablet, Chew and swallow 1 tablet (81 mg) 2 times a day., Disp: 84 tablet, Rfl: 0    atovaquone/proguanil HCl (MALARONE ORAL), Take by mouth once daily., Disp: , Rfl:     chlorhexidine (Peridex) 0.12 % solution, SWISH AND SPIT 15ML FOR 30 SECONDS NIGHT PRIOR TO SURGERY AND MORNING OF SURGERY, Disp: 473 mL, Rfl: 0    docusate sodium (Colace) 100 mg capsule, Take 1 capsule (100 mg) by mouth 2 times a day for 15 days., Disp: 30 capsule, Rfl: 0     ferrous sulfate 325 (65 Fe) mg EC tablet, Take 1 tablet by mouth once daily with breakfast. Do not crush, chew, or split., Disp: , Rfl:     folic acid (Folvite) 1 mg tablet, Take by mouth once daily., Disp: , Rfl:     hydroCHLOROthiazide (HYDRODiuril) 12.5 mg tablet, Take 1 tablet (12.5 mg) by mouth once daily., Disp: , Rfl:     lisinopril 20 mg tablet, Take 1 tablet (20 mg) by mouth once daily., Disp: , Rfl:     meloxicam (Mobic) 15 mg tablet, Take 1 tablet (15 mg) by mouth once daily., Disp: 30 tablet, Rfl: 1    multivitamin tablet, Take 1 tablet by mouth once daily., Disp: , Rfl:     ondansetron (Zofran) 4 mg tablet, Take 1 tablet (4 mg) by mouth every 8 hours if needed for nausea or vomiting., Disp: 20 tablet, Rfl: 0    oxyCODONE (Roxicodone) 5 mg immediate release tablet, Take 1-2 tablets (5-10 mg) by mouth every 6 hours if needed for severe pain (7 - 10) for up to 7 days., Disp: 40 tablet, Rfl: 0    pantoprazole (ProtoNix) 40 mg EC tablet, Take 1 tablet (40 mg) by mouth once daily in the morning. Take before meals. Do not crush, chew, or split., Disp: 30 tablet, Rfl: 0    polyethylene glycol (Glycolax, Miralax) 17 gram packet, Take 17 g by mouth once daily for 14 days. **Please fill this medication on the day of surgery with Dr. Morgan.**, Disp: 14 packet, Rfl: 0    traMADol (Ultram) 50 mg tablet, Take 1-2 tablets ( mg) by mouth every 6 hours if needed for severe pain (7 - 10) for up to 7 days., Disp: 40 tablet, Rfl: 0

## 2025-08-07 ENCOUNTER — HOSPITAL ENCOUNTER (OUTPATIENT)
Facility: HOSPITAL | Age: 65
Setting detail: OUTPATIENT SURGERY
Discharge: HOME HEALTH | End: 2025-08-07
Attending: STUDENT IN AN ORGANIZED HEALTH CARE EDUCATION/TRAINING PROGRAM | Admitting: STUDENT IN AN ORGANIZED HEALTH CARE EDUCATION/TRAINING PROGRAM
Payer: MEDICARE

## 2025-08-07 ENCOUNTER — ANESTHESIA (OUTPATIENT)
Dept: OPERATING ROOM | Facility: HOSPITAL | Age: 65
End: 2025-08-07
Payer: MEDICARE

## 2025-08-07 ENCOUNTER — PHARMACY VISIT (OUTPATIENT)
Dept: PHARMACY | Facility: CLINIC | Age: 65
End: 2025-08-07
Payer: COMMERCIAL

## 2025-08-07 ENCOUNTER — APPOINTMENT (OUTPATIENT)
Dept: RADIOLOGY | Facility: HOSPITAL | Age: 65
End: 2025-08-07
Payer: MEDICARE

## 2025-08-07 VITALS
TEMPERATURE: 96.8 F | WEIGHT: 163.36 LBS | OXYGEN SATURATION: 96 % | HEART RATE: 76 BPM | RESPIRATION RATE: 14 BRPM | SYSTOLIC BLOOD PRESSURE: 105 MMHG | HEIGHT: 62 IN | BODY MASS INDEX: 30.06 KG/M2 | DIASTOLIC BLOOD PRESSURE: 58 MMHG

## 2025-08-07 DIAGNOSIS — M16.11 PRIMARY OSTEOARTHRITIS OF RIGHT HIP: Primary | ICD-10-CM

## 2025-08-07 PROCEDURE — 2500000005 HC RX 250 GENERAL PHARMACY W/O HCPCS: Performed by: STUDENT IN AN ORGANIZED HEALTH CARE EDUCATION/TRAINING PROGRAM

## 2025-08-07 PROCEDURE — 3700000001 HC GENERAL ANESTHESIA TIME - INITIAL BASE CHARGE: Performed by: STUDENT IN AN ORGANIZED HEALTH CARE EDUCATION/TRAINING PROGRAM

## 2025-08-07 PROCEDURE — 7100000010 HC PHASE TWO TIME - EACH INCREMENTAL 1 MINUTE: Performed by: STUDENT IN AN ORGANIZED HEALTH CARE EDUCATION/TRAINING PROGRAM

## 2025-08-07 PROCEDURE — 2780000003 HC OR 278 NO HCPCS: Performed by: STUDENT IN AN ORGANIZED HEALTH CARE EDUCATION/TRAINING PROGRAM

## 2025-08-07 PROCEDURE — 7100000002 HC RECOVERY ROOM TIME - EACH INCREMENTAL 1 MINUTE: Performed by: STUDENT IN AN ORGANIZED HEALTH CARE EDUCATION/TRAINING PROGRAM

## 2025-08-07 PROCEDURE — 97110 THERAPEUTIC EXERCISES: CPT | Mod: GP

## 2025-08-07 PROCEDURE — 3700000002 HC GENERAL ANESTHESIA TIME - EACH INCREMENTAL 1 MINUTE: Performed by: STUDENT IN AN ORGANIZED HEALTH CARE EDUCATION/TRAINING PROGRAM

## 2025-08-07 PROCEDURE — A27130 PR TOTAL HIP ARTHROPLASTY: Performed by: ANESTHESIOLOGIST ASSISTANT

## 2025-08-07 PROCEDURE — 2700000047 HC OR 270 NO HCPCS: Performed by: STUDENT IN AN ORGANIZED HEALTH CARE EDUCATION/TRAINING PROGRAM

## 2025-08-07 PROCEDURE — A27130 PR TOTAL HIP ARTHROPLASTY: Performed by: ANESTHESIOLOGY

## 2025-08-07 PROCEDURE — 97116 GAIT TRAINING THERAPY: CPT | Mod: GP

## 2025-08-07 PROCEDURE — 72170 X-RAY EXAM OF PELVIS: CPT | Performed by: RADIOLOGY

## 2025-08-07 PROCEDURE — 72170 X-RAY EXAM OF PELVIS: CPT

## 2025-08-07 PROCEDURE — 2500000001 HC RX 250 WO HCPCS SELF ADMINISTERED DRUGS (ALT 637 FOR MEDICARE OP): Performed by: ANESTHESIOLOGY

## 2025-08-07 PROCEDURE — 27130 TOTAL HIP ARTHROPLASTY: CPT | Performed by: STUDENT IN AN ORGANIZED HEALTH CARE EDUCATION/TRAINING PROGRAM

## 2025-08-07 PROCEDURE — 97161 PT EVAL LOW COMPLEX 20 MIN: CPT | Mod: GP

## 2025-08-07 PROCEDURE — 7100000001 HC RECOVERY ROOM TIME - INITIAL BASE CHARGE: Performed by: STUDENT IN AN ORGANIZED HEALTH CARE EDUCATION/TRAINING PROGRAM

## 2025-08-07 PROCEDURE — 3600000017 HC OR TIME - EACH INCREMENTAL 1 MINUTE - PROCEDURE LEVEL SIX: Performed by: STUDENT IN AN ORGANIZED HEALTH CARE EDUCATION/TRAINING PROGRAM

## 2025-08-07 PROCEDURE — 2500000005 HC RX 250 GENERAL PHARMACY W/O HCPCS: Performed by: ANESTHESIOLOGIST ASSISTANT

## 2025-08-07 PROCEDURE — 2500000004 HC RX 250 GENERAL PHARMACY W/ HCPCS (ALT 636 FOR OP/ED): Mod: JZ | Performed by: ANESTHESIOLOGY

## 2025-08-07 PROCEDURE — 7100000009 HC PHASE TWO TIME - INITIAL BASE CHARGE: Performed by: STUDENT IN AN ORGANIZED HEALTH CARE EDUCATION/TRAINING PROGRAM

## 2025-08-07 PROCEDURE — A4649 SURGICAL SUPPLIES: HCPCS | Performed by: STUDENT IN AN ORGANIZED HEALTH CARE EDUCATION/TRAINING PROGRAM

## 2025-08-07 PROCEDURE — C1776 JOINT DEVICE (IMPLANTABLE): HCPCS | Performed by: STUDENT IN AN ORGANIZED HEALTH CARE EDUCATION/TRAINING PROGRAM

## 2025-08-07 PROCEDURE — 96372 THER/PROPH/DIAG INJ SC/IM: CPT | Performed by: ANESTHESIOLOGIST ASSISTANT

## 2025-08-07 PROCEDURE — P9045 ALBUMIN (HUMAN), 5%, 250 ML: HCPCS | Mod: JZ | Performed by: ANESTHESIOLOGIST ASSISTANT

## 2025-08-07 PROCEDURE — 76000 FLUOROSCOPY <1 HR PHYS/QHP: CPT | Mod: 59

## 2025-08-07 PROCEDURE — 7100000024 HC EXTENDED STAY RECOVERY PER MINUTE- PACU: Performed by: STUDENT IN AN ORGANIZED HEALTH CARE EDUCATION/TRAINING PROGRAM

## 2025-08-07 PROCEDURE — 3600000018 HC OR TIME - INITIAL BASE CHARGE - PROCEDURE LEVEL SIX: Performed by: STUDENT IN AN ORGANIZED HEALTH CARE EDUCATION/TRAINING PROGRAM

## 2025-08-07 PROCEDURE — 2720000007 HC OR 272 NO HCPCS: Performed by: STUDENT IN AN ORGANIZED HEALTH CARE EDUCATION/TRAINING PROGRAM

## 2025-08-07 PROCEDURE — 2500000004 HC RX 250 GENERAL PHARMACY W/ HCPCS (ALT 636 FOR OP/ED): Mod: JZ | Performed by: ANESTHESIOLOGIST ASSISTANT

## 2025-08-07 PROCEDURE — RXMED WILLOW AMBULATORY MEDICATION CHARGE

## 2025-08-07 PROCEDURE — C1713 ANCHOR/SCREW BN/BN,TIS/BN: HCPCS | Performed by: STUDENT IN AN ORGANIZED HEALTH CARE EDUCATION/TRAINING PROGRAM

## 2025-08-07 DEVICE — ACTIS DUOFIX HIP PROSTHESIS (FEMORAL STEM 12/14 TAPER CEMENTLESS SIZE 4 STD COLLAR)  CE
Type: IMPLANTABLE DEVICE | Site: HIP | Status: FUNCTIONAL
Brand: ACTIS

## 2025-08-07 DEVICE — BIOLOX DELTA TS CERAMIC FEMORAL HEAD 12/14 TAPER REVISION DIAMETER 40MM +5
Type: IMPLANTABLE DEVICE | Site: HIP | Status: FUNCTIONAL
Brand: BIOLOX DELTA

## 2025-08-07 DEVICE — IMPLANTABLE DEVICE: Type: IMPLANTABLE DEVICE | Site: HIP | Status: FUNCTIONAL

## 2025-08-07 DEVICE — PINNACLE CANCELLOUS BONE SCREW 6.5MM X 25MM
Type: IMPLANTABLE DEVICE | Site: HIP | Status: FUNCTIONAL
Brand: PINNACLE

## 2025-08-07 RX ORDER — ONDANSETRON 4 MG/1
4 TABLET, FILM COATED ORAL EVERY 8 HOURS PRN
Status: DISCONTINUED | OUTPATIENT
Start: 2025-08-07 | End: 2025-08-07 | Stop reason: HOSPADM

## 2025-08-07 RX ORDER — ONDANSETRON HYDROCHLORIDE 2 MG/ML
4 INJECTION, SOLUTION INTRAVENOUS ONCE AS NEEDED
Status: DISCONTINUED | OUTPATIENT
Start: 2025-08-07 | End: 2025-08-07 | Stop reason: HOSPADM

## 2025-08-07 RX ORDER — CEFAZOLIN 1 G/1
INJECTION, POWDER, FOR SOLUTION INTRAVENOUS AS NEEDED
Status: DISCONTINUED | OUTPATIENT
Start: 2025-08-07 | End: 2025-08-07

## 2025-08-07 RX ORDER — DEXMEDETOMIDINE HYDROCHLORIDE 4 UG/ML
INJECTION, SOLUTION INTRAVENOUS CONTINUOUS PRN
Status: DISCONTINUED | OUTPATIENT
Start: 2025-08-07 | End: 2025-08-07

## 2025-08-07 RX ORDER — NORETHINDRONE AND ETHINYL ESTRADIOL 0.5-0.035
KIT ORAL AS NEEDED
Status: DISCONTINUED | OUTPATIENT
Start: 2025-08-07 | End: 2025-08-07

## 2025-08-07 RX ORDER — NALOXONE HYDROCHLORIDE 1 MG/ML
0.2 INJECTION INTRAMUSCULAR; INTRAVENOUS; SUBCUTANEOUS EVERY 5 MIN PRN
Status: DISCONTINUED | OUTPATIENT
Start: 2025-08-07 | End: 2025-08-07 | Stop reason: HOSPADM

## 2025-08-07 RX ORDER — MEPERIDINE HYDROCHLORIDE 25 MG/ML
12.5 INJECTION INTRAMUSCULAR; INTRAVENOUS; SUBCUTANEOUS EVERY 10 MIN PRN
Status: DISCONTINUED | OUTPATIENT
Start: 2025-08-07 | End: 2025-08-07 | Stop reason: HOSPADM

## 2025-08-07 RX ORDER — SODIUM CHLORIDE 0.9 G/100ML
INJECTION, SOLUTION IRRIGATION AS NEEDED
Status: DISCONTINUED | OUTPATIENT
Start: 2025-08-07 | End: 2025-08-07 | Stop reason: HOSPADM

## 2025-08-07 RX ORDER — ALBUMIN HUMAN 50 G/1000ML
SOLUTION INTRAVENOUS AS NEEDED
Status: DISCONTINUED | OUTPATIENT
Start: 2025-08-07 | End: 2025-08-07

## 2025-08-07 RX ORDER — MIDAZOLAM HYDROCHLORIDE 2 MG/2ML
INJECTION, SOLUTION INTRAMUSCULAR; INTRAVENOUS AS NEEDED
Status: DISCONTINUED | OUTPATIENT
Start: 2025-08-07 | End: 2025-08-07

## 2025-08-07 RX ORDER — LIDOCAINE HYDROCHLORIDE 20 MG/ML
INJECTION, SOLUTION EPIDURAL; INFILTRATION; INTRACAUDAL; PERINEURAL AS NEEDED
Status: DISCONTINUED | OUTPATIENT
Start: 2025-08-07 | End: 2025-08-07

## 2025-08-07 RX ORDER — CEFAZOLIN SODIUM 2 G/50ML
2 SOLUTION INTRAVENOUS EVERY 8 HOURS
Status: DISCONTINUED | OUTPATIENT
Start: 2025-08-07 | End: 2025-08-07 | Stop reason: HOSPADM

## 2025-08-07 RX ORDER — HYDROMORPHONE HYDROCHLORIDE 0.2 MG/ML
0.2 INJECTION INTRAMUSCULAR; INTRAVENOUS; SUBCUTANEOUS EVERY 4 HOURS PRN
Status: DISCONTINUED | OUTPATIENT
Start: 2025-08-07 | End: 2025-08-07 | Stop reason: HOSPADM

## 2025-08-07 RX ORDER — KETOROLAC TROMETHAMINE 30 MG/ML
INJECTION, SOLUTION INTRAMUSCULAR; INTRAVENOUS AS NEEDED
Status: DISCONTINUED | OUTPATIENT
Start: 2025-08-07 | End: 2025-08-07

## 2025-08-07 RX ORDER — KETOROLAC TROMETHAMINE 30 MG/ML
15 INJECTION, SOLUTION INTRAMUSCULAR; INTRAVENOUS EVERY 6 HOURS
Status: DISCONTINUED | OUTPATIENT
Start: 2025-08-07 | End: 2025-08-07 | Stop reason: HOSPADM

## 2025-08-07 RX ORDER — PANTOPRAZOLE SODIUM 40 MG/1
40 TABLET, DELAYED RELEASE ORAL
Status: DISCONTINUED | OUTPATIENT
Start: 2025-08-08 | End: 2025-08-07 | Stop reason: HOSPADM

## 2025-08-07 RX ORDER — SCOPOLAMINE 1 MG/3D
1 PATCH, EXTENDED RELEASE TRANSDERMAL ONCE
Status: DISCONTINUED | OUTPATIENT
Start: 2025-08-07 | End: 2025-08-07 | Stop reason: HOSPADM

## 2025-08-07 RX ORDER — ONDANSETRON HYDROCHLORIDE 2 MG/ML
INJECTION, SOLUTION INTRAVENOUS AS NEEDED
Status: DISCONTINUED | OUTPATIENT
Start: 2025-08-07 | End: 2025-08-07

## 2025-08-07 RX ORDER — OXYCODONE HYDROCHLORIDE 5 MG/1
5 TABLET ORAL EVERY 4 HOURS PRN
Status: DISCONTINUED | OUTPATIENT
Start: 2025-08-07 | End: 2025-08-07 | Stop reason: HOSPADM

## 2025-08-07 RX ORDER — POLYETHYLENE GLYCOL 3350 17 G/17G
17 POWDER, FOR SOLUTION ORAL DAILY
Status: DISCONTINUED | OUTPATIENT
Start: 2025-08-07 | End: 2025-08-07 | Stop reason: HOSPADM

## 2025-08-07 RX ORDER — PROPOFOL 10 MG/ML
INJECTION, EMULSION INTRAVENOUS AS NEEDED
Status: DISCONTINUED | OUTPATIENT
Start: 2025-08-07 | End: 2025-08-07

## 2025-08-07 RX ORDER — ACETAMINOPHEN 325 MG/1
650 TABLET ORAL EVERY 4 HOURS PRN
Status: DISCONTINUED | OUTPATIENT
Start: 2025-08-07 | End: 2025-08-07 | Stop reason: HOSPADM

## 2025-08-07 RX ORDER — LIDOCAINE HYDROCHLORIDE 10 MG/ML
0.1 INJECTION, SOLUTION EPIDURAL; INFILTRATION; INTRACAUDAL; PERINEURAL ONCE
Status: DISCONTINUED | OUTPATIENT
Start: 2025-08-07 | End: 2025-08-07 | Stop reason: HOSPADM

## 2025-08-07 RX ORDER — SODIUM CHLORIDE, SODIUM LACTATE, POTASSIUM CHLORIDE, CALCIUM CHLORIDE 600; 310; 30; 20 MG/100ML; MG/100ML; MG/100ML; MG/100ML
INJECTION, SOLUTION INTRAVENOUS CONTINUOUS PRN
Status: DISCONTINUED | OUTPATIENT
Start: 2025-08-07 | End: 2025-08-07

## 2025-08-07 RX ORDER — OXYCODONE HYDROCHLORIDE 5 MG/1
10 TABLET ORAL EVERY 4 HOURS PRN
Status: DISCONTINUED | OUTPATIENT
Start: 2025-08-07 | End: 2025-08-07 | Stop reason: HOSPADM

## 2025-08-07 RX ORDER — METOCLOPRAMIDE HYDROCHLORIDE 5 MG/ML
10 INJECTION INTRAMUSCULAR; INTRAVENOUS ONCE AS NEEDED
Status: DISCONTINUED | OUTPATIENT
Start: 2025-08-07 | End: 2025-08-07 | Stop reason: HOSPADM

## 2025-08-07 RX ORDER — WATER 1 ML/ML
INJECTION IRRIGATION AS NEEDED
Status: DISCONTINUED | OUTPATIENT
Start: 2025-08-07 | End: 2025-08-07 | Stop reason: HOSPADM

## 2025-08-07 RX ORDER — SYRING-NEEDL,DISP,INSUL,0.3 ML 29 G X1/2"
148 SYRINGE, EMPTY DISPOSABLE MISCELLANEOUS ONCE AS NEEDED
Status: DISCONTINUED | OUTPATIENT
Start: 2025-08-07 | End: 2025-08-07 | Stop reason: HOSPADM

## 2025-08-07 RX ORDER — ONDANSETRON HYDROCHLORIDE 2 MG/ML
4 INJECTION, SOLUTION INTRAVENOUS EVERY 8 HOURS PRN
Status: DISCONTINUED | OUTPATIENT
Start: 2025-08-07 | End: 2025-08-07 | Stop reason: HOSPADM

## 2025-08-07 RX ORDER — SODIUM CHLORIDE, SODIUM LACTATE, POTASSIUM CHLORIDE, CALCIUM CHLORIDE 600; 310; 30; 20 MG/100ML; MG/100ML; MG/100ML; MG/100ML
100 INJECTION, SOLUTION INTRAVENOUS CONTINUOUS
Status: ACTIVE | OUTPATIENT
Start: 2025-08-07 | End: 2025-08-07

## 2025-08-07 RX ORDER — SODIUM CHLORIDE, SODIUM LACTATE, POTASSIUM CHLORIDE, CALCIUM CHLORIDE 600; 310; 30; 20 MG/100ML; MG/100ML; MG/100ML; MG/100ML
50 INJECTION, SOLUTION INTRAVENOUS CONTINUOUS
Status: DISCONTINUED | OUTPATIENT
Start: 2025-08-07 | End: 2025-08-07 | Stop reason: HOSPADM

## 2025-08-07 RX ORDER — TRANEXAMIC ACID 1 G/10ML
INJECTION, SOLUTION INTRAVENOUS AS NEEDED
Status: DISCONTINUED | OUTPATIENT
Start: 2025-08-07 | End: 2025-08-07

## 2025-08-07 RX ORDER — BUPIVACAINE HCL/EPINEPHRINE 0.5-1:200K
VIAL (ML) INJECTION AS NEEDED
Status: DISCONTINUED | OUTPATIENT
Start: 2025-08-07 | End: 2025-08-07 | Stop reason: HOSPADM

## 2025-08-07 RX ORDER — BISACODYL 5 MG
10 TABLET, DELAYED RELEASE (ENTERIC COATED) ORAL DAILY PRN
Status: DISCONTINUED | OUTPATIENT
Start: 2025-08-07 | End: 2025-08-07 | Stop reason: HOSPADM

## 2025-08-07 RX ORDER — PHENYLEPHRINE HCL IN 0.9% NACL 1 MG/10 ML
SYRINGE (ML) INTRAVENOUS AS NEEDED
Status: DISCONTINUED | OUTPATIENT
Start: 2025-08-07 | End: 2025-08-07

## 2025-08-07 RX ORDER — ASPIRIN 81 MG/1
81 TABLET ORAL 2 TIMES DAILY
Status: DISCONTINUED | OUTPATIENT
Start: 2025-08-07 | End: 2025-08-07 | Stop reason: HOSPADM

## 2025-08-07 RX ADMIN — MIDAZOLAM HYDROCHLORIDE 2 MG: 1 INJECTION, SOLUTION INTRAMUSCULAR; INTRAVENOUS at 07:27

## 2025-08-07 RX ADMIN — OXYCODONE HYDROCHLORIDE 5 MG: 5 TABLET ORAL at 10:05

## 2025-08-07 RX ADMIN — HYDROMORPHONE HYDROCHLORIDE 0.5 MG: 1 INJECTION, SOLUTION INTRAMUSCULAR; INTRAVENOUS; SUBCUTANEOUS at 10:18

## 2025-08-07 RX ADMIN — PROPOFOL 20 MG: 10 INJECTION, EMULSION INTRAVENOUS at 07:43

## 2025-08-07 RX ADMIN — Medication 100 MCG: at 07:52

## 2025-08-07 RX ADMIN — Medication 200 MCG: at 08:10

## 2025-08-07 RX ADMIN — Medication 100 MCG: at 08:58

## 2025-08-07 RX ADMIN — ONDANSETRON 4 MG: 2 INJECTION INTRAMUSCULAR; INTRAVENOUS at 09:11

## 2025-08-07 RX ADMIN — Medication 200 MCG: at 08:37

## 2025-08-07 RX ADMIN — Medication 400 MCG: at 09:00

## 2025-08-07 RX ADMIN — DEXAMETHASONE SODIUM PHOSPHATE 4 MG: 4 INJECTION, SOLUTION INTRA-ARTICULAR; INTRALESIONAL; INTRAMUSCULAR; INTRAVENOUS; SOFT TISSUE at 07:44

## 2025-08-07 RX ADMIN — HYDROMORPHONE HYDROCHLORIDE 0.5 MG: 1 INJECTION, SOLUTION INTRAMUSCULAR; INTRAVENOUS; SUBCUTANEOUS at 10:13

## 2025-08-07 RX ADMIN — HYDROMORPHONE HYDROCHLORIDE 0.5 MG: 1 INJECTION, SOLUTION INTRAMUSCULAR; INTRAVENOUS; SUBCUTANEOUS at 10:41

## 2025-08-07 RX ADMIN — Medication 200 MCG: at 08:29

## 2025-08-07 RX ADMIN — KETOROLAC TROMETHAMINE 30 MG: 30 INJECTION, SOLUTION INTRAMUSCULAR at 09:11

## 2025-08-07 RX ADMIN — GLYCOPYRROLATE 0.2 MG: 0.2 INJECTION, SOLUTION INTRAMUSCULAR; INTRAVENOUS at 07:50

## 2025-08-07 RX ADMIN — ALBUMIN HUMAN 250 ML: 0.05 INJECTION, SOLUTION INTRAVENOUS at 09:22

## 2025-08-07 RX ADMIN — TRANEXAMIC ACID 1000 MG: 100 INJECTION, SOLUTION INTRAVENOUS at 07:44

## 2025-08-07 RX ADMIN — Medication 200 MCG: at 09:07

## 2025-08-07 RX ADMIN — MEPIVACAINE HYDROCHLORIDE 3.5 ML: 15 INJECTION, SOLUTION EPIDURAL; INFILTRATION at 07:38

## 2025-08-07 RX ADMIN — Medication 300 MCG: at 08:50

## 2025-08-07 RX ADMIN — DEXMEDETOMIDINE HYDROCHLORIDE 0.4 MCG/KG/HR: 4 INJECTION, SOLUTION INTRAVENOUS at 07:50

## 2025-08-07 RX ADMIN — PROPOFOL 100 MCG/KG/MIN: 10 INJECTION, EMULSION INTRAVENOUS at 07:44

## 2025-08-07 RX ADMIN — Medication 300 MCG: at 09:17

## 2025-08-07 RX ADMIN — Medication 200 MCG: at 08:04

## 2025-08-07 RX ADMIN — MIDAZOLAM HYDROCHLORIDE 4 MG: 1 INJECTION, SOLUTION INTRAMUSCULAR; INTRAVENOUS at 07:32

## 2025-08-07 RX ADMIN — LIDOCAINE HYDROCHLORIDE 100 MG: 20 INJECTION, SOLUTION EPIDURAL; INFILTRATION; INTRACAUDAL; PERINEURAL at 07:43

## 2025-08-07 RX ADMIN — TRANEXAMIC ACID 1000 MG: 100 INJECTION, SOLUTION INTRAVENOUS at 09:11

## 2025-08-07 RX ADMIN — CEFAZOLIN 2 G: 330 INJECTION, POWDER, FOR SOLUTION INTRAMUSCULAR; INTRAVENOUS at 07:44

## 2025-08-07 RX ADMIN — EPHEDRINE SULFATE 25 MG: 50 INJECTION, SOLUTION INTRAVENOUS at 09:18

## 2025-08-07 RX ADMIN — SODIUM CHLORIDE, POTASSIUM CHLORIDE, SODIUM LACTATE AND CALCIUM CHLORIDE: 600; 310; 30; 20 INJECTION, SOLUTION INTRAVENOUS at 07:26

## 2025-08-07 RX ADMIN — HYDROMORPHONE HYDROCHLORIDE 0.5 MG: 1 INJECTION, SOLUTION INTRAMUSCULAR; INTRAVENOUS; SUBCUTANEOUS at 10:27

## 2025-08-07 RX ADMIN — Medication 200 MCG: at 08:43

## 2025-08-07 ASSESSMENT — PAIN - FUNCTIONAL ASSESSMENT
PAIN_FUNCTIONAL_ASSESSMENT: 0-10
PAIN_FUNCTIONAL_ASSESSMENT: UNABLE TO SELF-REPORT
PAIN_FUNCTIONAL_ASSESSMENT: 0-10
PAIN_FUNCTIONAL_ASSESSMENT: UNABLE TO SELF-REPORT
PAIN_FUNCTIONAL_ASSESSMENT: 0-10

## 2025-08-07 ASSESSMENT — PAIN SCALES - GENERAL
PAINLEVEL_OUTOF10: 8
PAINLEVEL_OUTOF10: 2
PAINLEVEL_OUTOF10: 7
PAINLEVEL_OUTOF10: 3
PAINLEVEL_OUTOF10: 5 - MODERATE PAIN
PAINLEVEL_OUTOF10: 8
PAINLEVEL_OUTOF10: 5 - MODERATE PAIN
PAINLEVEL_OUTOF10: 5 - MODERATE PAIN
PAINLEVEL_OUTOF10: 7
PAINLEVEL_OUTOF10: 7
PAINLEVEL_OUTOF10: 2
PAINLEVEL_OUTOF10: 0 - NO PAIN
PAINLEVEL_OUTOF10: 3
PAINLEVEL_OUTOF10: 4
PAINLEVEL_OUTOF10: 2
PAINLEVEL_OUTOF10: 7
PAIN_LEVEL: 0

## 2025-08-07 ASSESSMENT — PAIN DESCRIPTION - LOCATION
LOCATION: HIP

## 2025-08-07 ASSESSMENT — COGNITIVE AND FUNCTIONAL STATUS - GENERAL
TURNING FROM BACK TO SIDE WHILE IN FLAT BAD: A LITTLE
CLIMB 3 TO 5 STEPS WITH RAILING: A LITTLE
MOVING FROM LYING ON BACK TO SITTING ON SIDE OF FLAT BED WITH BEDRAILS: A LITTLE
CLIMB 3 TO 5 STEPS WITH RAILING: A LITTLE
MOVING TO AND FROM BED TO CHAIR: A LITTLE
WALKING IN HOSPITAL ROOM: A LITTLE
MOBILITY SCORE: 18
STANDING UP FROM CHAIR USING ARMS: A LITTLE
TURNING FROM BACK TO SIDE WHILE IN FLAT BAD: A LITTLE
MOBILITY SCORE: 18
STANDING UP FROM CHAIR USING ARMS: A LITTLE
MOVING FROM LYING ON BACK TO SITTING ON SIDE OF FLAT BED WITH BEDRAILS: A LITTLE
MOVING TO AND FROM BED TO CHAIR: A LITTLE
WALKING IN HOSPITAL ROOM: A LITTLE

## 2025-08-07 ASSESSMENT — PAIN DESCRIPTION - ORIENTATION
ORIENTATION: RIGHT

## 2025-08-07 ASSESSMENT — COLUMBIA-SUICIDE SEVERITY RATING SCALE - C-SSRS
2. HAVE YOU ACTUALLY HAD ANY THOUGHTS OF KILLING YOURSELF?: NO
6. HAVE YOU EVER DONE ANYTHING, STARTED TO DO ANYTHING, OR PREPARED TO DO ANYTHING TO END YOUR LIFE?: NO
1. IN THE PAST MONTH, HAVE YOU WISHED YOU WERE DEAD OR WISHED YOU COULD GO TO SLEEP AND NOT WAKE UP?: NO

## 2025-08-07 NOTE — PROGRESS NOTES
Physical Therapy    Physical Therapy Evaluation    Patient Name: Jo-Ann Lugo  MRN: [id number]  Department: [facility] PREPOST  Room: [facility]  Today's Date: 8/7/2025   Time Calculation  Start Time: 1151  Stop Time: 1209  Time Calculation (min): 18 min    Assessment/Plan   PT Assessment  PT Assessment Results: Decreased strength, Decreased range of motion, Decreased endurance, Impaired balance, Decreased mobility  Rehab Prognosis: Excellent  Barriers to Discharge Home: Caregiver assistance (vitals unstable)  End of Session Communication: Bedside nurse  Assessment Comment: Pt assisted back to bed after completion of PT evaluation. She was brought through her HEP briefy, but upon sitting up at EOB, she demos BP with MAP of 50. She was deemed not appropriate to ambulate at this time and further treatment was held. Recommend at least one added session to ensure independence with HEP, stairs, gait, transfers, and bed mobility.  End of Session Patient Position: Bed, 3 rail up, Alarm off, not on at start of session  IP OR SWING BED PT PLAN  Inpatient or Swing Bed: Inpatient  PT Plan  Treatment/Interventions: Bed mobility, Transfer training, Gait training, Stair training  PT Plan: Ongoing PT  PT Frequency: BID  PT Discharge Recommendations: Low intensity level of continued care  Equipment Recommended upon Discharge: Wheeled walker  PT Recommended Transfer Status: Assist x1  PT - OK to Discharge: Yes    Subjective     PT Visit Info:  PT Received On: 08/07/25  General Visit Information:  General  Reason for Referral: Pt seen s/p R VERONICA with anterior approach/prec  Referred By: Abby Morgan MD  Past Medical History Relevant to Rehab: Medical History[1]   Family/Caregiver Present: Yes  Caregiver Feedback: present and able to listen  Prior to Session Communication: Bedside nurse  Patient Position Received: Bed, 3 rail up, Alarm off, not on at start of session  Preferred Learning Style: auditory  General Comment:  agreeable to session, found in supine  Home Living:  Home Living  Type of Home: House  Lives With: Spouse  Home Adaptive Equipment: Walker rolling or standard  Home Layout: One level  Home Access: No concerns  Prior Level of Function:  Prior Function Per Pt/Caregiver Report  Level of [place]: Independent with ADLs and functional transfers, Independent with homemaking with ambulation  Prior Function Comments: Pt independent community ambulator at baseline, lives with able bodied caregiver. Doesn't use an AD at baseline  Precautions:         Date/Time Vitals Session Patient Position Pulse Resp SpO2 BP MAP (mmHg)    08/07/25 1415 --  --  62  16  98 %  100/58  69     08/07/25 1455 --  --  60  16  98 %  111/73  79      Vital Signs Comment: seated bp 90/51 (MAP 50) - pt assisted back to bed.     Objective   Pain:  Pain Assessment  Pain Assessment: 0-10  0-10 (Numeric) Pain Score: 5 - Moderate pain  Pain Type: Surgical pain  Pain Location: Hip  Cognition:  Cognition  Orientation Level: Oriented X4    General Assessments:                  Activity Tolerance  Endurance: Tolerates less than 10 min exercise with changes in vital signs         Strength  Strength Comments: Distal neurovascular intact  Strength  Strength Comments: Distal neurovascular intact                Postural Control  Postural Control: Within Functional Limits    Static Sitting Balance  Static Sitting-Balance Support: Bilateral upper extremity supported  Static Sitting-Level of Assistance: Close supervision  Dynamic Sitting Balance  Dynamic Sitting-Balance Support: Bilateral upper extremity supported  Dynamic Sitting-Level of Assistance: Close supervision    Static Standing Balance  Static Standing-Balance Support: Bilateral upper extremity supported  Static Standing-Level of Assistance: Close supervision  Functional Assessments:  Bed Mobility  Bed Mobility: Yes  Bed Mobility 1  Bed Mobility 1: Supine to sitting, Sitting to supine  Level of Assistance  1: Minimum assistance  Bed Mobility Comments 1: Min A for supine<>sit with assist for LE's on/off of bed    Outcome Measures:  [facility] Basic Mobility  Turning from your back to your side while in a flat bed without using bedrails: A little  Moving from lying on your back to sitting on the side of a flat bed without using bedrails: A little  Moving to and from bed to chair (including a wheelchair): A little  Standing up from a chair using your arms (e.g. wheelchair or bedside chair): A little  To walk in hospital room: A little  Climbing 3-5 steps with railing: A little  Basic Mobility - Total Score: 18    Encounter Problems       Encounter Problems (Active)       Mobility       LTG - Patient will navigate 4-6 steps with rails/device mod I with HR       Start:  08/07/25    Expected End:  08/21/25            STG - Patient will ambulate 50' or more mod I with LRAD       Start:  08/07/25    Expected End:  08/21/25               PT Transfers       STG - Patient will perform bed mobility with mod I and LRAD       Start:  08/07/25    Expected End:  08/21/25            STG - Patient will transfer sit to and from stand mod I with LRAD       Start:  08/07/25    Expected End:  08/21/25                   Education Documentation  Handouts, taught by Morris Siu PT at 8/7/2025  3:15 PM.  Learner: Patient  Readiness: Acceptance  Method: Explanation  Response: Verbalizes Understanding    Precautions, taught by Morris Siu PT at 8/7/2025  3:15 PM.  Learner: Patient  Readiness: Acceptance  Method: Explanation  Response: Verbalizes Understanding    Body Mechanics, taught by Morris Siu PT at 8/7/2025  3:15 PM.  Learner: Patient  Readiness: Acceptance  Method: Explanation  Response: Verbalizes Understanding    Home Exercise Program, taught by Morris Siu PT at 8/7/2025  3:15 PM.  Learner: Patient  Readiness: Acceptance  Method: Explanation  Response: Verbalizes Understanding    Mobility Training, taught by Morris Siu PT at 8/7/2025  3:15  PM.  Learner: Patient  Readiness: Acceptance  Method: Explanation  Response: Verbalizes Understanding    Education Comments  No comments found.                 [1]   Past Medical History:  Diagnosis Date    Anemia     Off and on for years.    Anxiety     Sometimes.    Cataract 2023    Had surgery in my right eye about 2-3 years ago.    COVID-19     Had it once, a few years ago.    Fractures 1979    Left ankle, hardware from fall in my 20s.    Hypertension     Been on meds for several years    Insomnia     Pelvic mass     Large cyst and ovary removed several years ago    Primary osteoarthritis of right hip     Plan: Right Hip Total Arthroplasty ~ Anterior Approach 8/7/25

## 2025-08-07 NOTE — ANESTHESIA POSTPROCEDURE EVALUATION
Patient: Jo-Ann Lugo    Procedure Summary       Date: 08/07/25 Room / Location: [facility] 05 / Virtual [facility]    Anesthesia Start: 0728 Anesthesia Stop: 0938    Procedure: Right Hip Total Arthroplasty ~ Anterior Approach (Right: Hip) Diagnosis:       Primary osteoarthritis of right hip      (Primary osteoarthritis of right hip [M16.11])    Surgeons: Abby Morgan MD Responsible Provider: Dash Monahan MD    Anesthesia Type: spinal ASA Status: 2            Anesthesia Type: spinal    Vitals Value Taken Time   /59 08/07/25 10:01   Temp 36.5 °C (97.7 °F) 08/07/25 10:00   Pulse 107 08/07/25 10:08   Resp 15 08/07/25 10:00   SpO2 95 % 08/07/25 10:08   Vitals shown include unfiled device data.    Anesthesia Post Evaluation    Patient location during evaluation: bedside  Patient participation: complete - patient cannot participate  Level of consciousness: awake  Pain score: 0  Pain management: adequate  Airway patency: patent  Cardiovascular status: acceptable and hemodynamically stable  Respiratory status: acceptable and nonlabored ventilation  Hydration status: acceptable  Postoperative Nausea and Vomiting: none        No notable events documented.

## 2025-08-07 NOTE — ANESTHESIA PROCEDURE NOTES
Spinal Block    Patient location during procedure: OR  Start time: 8/7/2025 7:38 AM  End time: 8/7/2025 7:30 AM  Reason for block: primary anesthetic  Staffing  Performed: MARY and SHANNON   Authorized by: Dash Monahan MD    Performed by: MARY Uribe    Preanesthetic Checklist  Completed: patient identified, IV checked, risks and benefits discussed, surgical consent, monitors and equipment checked, pre-op evaluation, timeout performed and sterile techniques followed  Block Timeout  RN/Licensed healthcare professional reads aloud to the Anesthesia provider and entire team: Patient identity, procedure with side and site, patient position, and as applicable the availability of implants/special equipment/special requirements.  Patient on coagulant treatment: no  Timeout performed at:   Spinal Block  Patient position: sitting  Prep: ChloraPrep  Sterility prep: cap, drape, gloves, hand hygiene and mask  Sedation level: light sedation  Patient monitoring: blood pressure, continuous pulse oximetry and heart rate  Approach: midline  Vertebral space: L3-4  Injection technique: single-shot  Needle  Needle type: pencil-point   Needle gauge: 24 G  Needle length: 4 in  Free flowing CSF: yes    Assessment  Sensory level: T6 bilateral  Block outcome: patient comfortable  Procedure assessment: patient sedated but conversant throughout procedure and patient tolerated procedure well with no immediate complications  Additional Notes  3.5mL 1.5% mepivicaine. Pt tolerated procedure well. No apparent complications

## 2025-08-07 NOTE — PROGRESS NOTES
Physical Therapy    Physical Therapy Treatment    Patient Name: Jo-Ann Lugo  MRN: [id number]  [facility]  [facility]  Today's Date: 8/7/2025  Time Calculation  Start Time: 1429  Stop Time: 1452  Time Calculation (min): 23 min         Assessment/Plan   PT Assessment  PT Assessment Results: Decreased strength, Decreased range of motion, Decreased endurance, Impaired balance, Decreased mobility  Rehab Prognosis: Excellent  Barriers to Discharge Home: Caregiver assistance (vitals unstable)  End of Session Communication: Bedside nurse  Assessment Comment: Pt tolerates session well.  End of Session Patient Position: Bed, 3 rail up, Alarm off, not on at start of session  PT Plan  Inpatient/Swing Bed or Outpatient: Inpatient  PT Plan  Treatment/Interventions: Bed mobility, Transfer training, Gait training, Stair training  PT Plan: Ongoing PT  PT Frequency: BID  PT Discharge Recommendations: Low intensity level of continued care  Equipment Recommended upon Discharge: Wheeled walker  PT Recommended Transfer Status: Assist x1  PT - OK to Discharge: Yes    PT Visit Info:  PT Received On: 08/07/25     General Visit Information:   General  Reason for Referral: Pt seen s/p R VERONICA with anterior approach/prec  Referred By: Abby Morgan MD  Past Medical History Relevant to Rehab: Medical History[1]   Family/Caregiver Present: Yes  Caregiver Feedback: present and able to listen  Prior to Session Communication: Bedside nurse  Patient Position Received: Bed, 3 rail up, Alarm off, not on at start of session  Preferred Learning Style: auditory  General Comment: agreeable to session, found in supine    Subjective   Precautions:  Precautions  LE Weight Bearing Status: Weight Bearing as Tolerated  Medical Precautions: Fall precautions     Date/Time Vitals Session Patient Position Pulse Resp SpO2 BP MAP (mmHg)    08/07/25 1415 --  --  62  16  98 %  100/58  69     08/07/25 1455 --  --  60  16  98 %  111/73   79      Vital Signs Comment: seated bp 90/51 (MAP 50) - pt assisted back to bed.     Objective   Pain:  Pain Assessment  Pain Assessment: 0-10  0-10 (Numeric) Pain Score: 4  Pain Type: Surgical pain  Pain Location: Hip  Cognition:  Cognition  Overall Cognitive Status: Within Functional Limits  Orientation Level: Oriented X4  Coordination:     Postural Control:  Postural Control  Postural Control: Within Functional Limits  Static Sitting Balance  Static Sitting-Balance Support: Bilateral upper extremity supported  Static Sitting-Level of Assistance: Close supervision  Dynamic Sitting Balance  Dynamic Sitting-Balance Support: Bilateral upper extremity supported  Dynamic Sitting-Level of Assistance: Close supervision  Static Standing Balance  Static Standing-Balance Support: Bilateral upper extremity supported  Static Standing-Level of Assistance: Close supervision    Activity Tolerance:  Activity Tolerance  Endurance: Tolerates 30 min exercise with multiple rests  Treatments:  Therapeutic Exercise  Therapeutic Exercise Performed: Yes  Therapeutic Exercise Activity 1: Ankle pumps, QS, SAQ, LAQ, Heel slides, hip abd (10x ea)         Bed Mobility  Bed Mobility: Yes  Bed Mobility 1  Bed Mobility 1: Supine to sitting, Sitting to supine  Level of Assistance 1: Close supervision  Bed Mobility Comments 1: increased time and effort    Ambulation/Gait Training  Ambulation/Gait Training Performed: Yes  Ambulation/Gait Training 1  Surface 1: Level tile  Device 1: Rolling walker  Assistance 1: Close supervision  Comments/Distance (ft) 1: 50' SBA with FWW and no instability. Occasional step through gait but more comfortable with step to at this time.  Transfers  Transfer: Yes  Transfer 1  Technique 1: Sit to stand, Stand to sit  Trials/Comments 1: SBA with FWW and no instability/LOB    Stairs  Stairs: Yes  Stairs  Rails 1: Bilateral  Curb Step 1: No  Device 1: Railing  Assistance 1: Close supervision  Comment/Number of Steps 1:  proper sequencing demo with B HR and SBA    Outcome Measures:  [facility] Basic Mobility  Turning from your back to your side while in a flat bed without using bedrails: A little  Moving from lying on your back to sitting on the side of a flat bed without using bedrails: A little  Moving to and from bed to chair (including a wheelchair): A little  Standing up from a chair using your arms (e.g. wheelchair or bedside chair): A little  To walk in hospital room: A little  Climbing 3-5 steps with railing: A little  Basic Mobility - Total Score: 18    Education Documentation  Handouts, taught by Morris Siu PT at 8/7/2025  3:35 PM.  Learner: Patient  Readiness: Acceptance  Method: Explanation  Response: Verbalizes Understanding    Precautions, taught by Morris Siu PT at 8/7/2025  3:35 PM.  Learner: Patient  Readiness: Acceptance  Method: Explanation  Response: Verbalizes Understanding    Body Mechanics, taught by Morris Siu PT at 8/7/2025  3:35 PM.  Learner: Patient  Readiness: Acceptance  Method: Explanation  Response: Verbalizes Understanding    Home Exercise Program, taught by Morris Siu PT at 8/7/2025  3:35 PM.  Learner: Patient  Readiness: Acceptance  Method: Explanation  Response: Verbalizes Understanding    Mobility Training, taught by Morris Siu PT at 8/7/2025  3:35 PM.  Learner: Patient  Readiness: Acceptance  Method: Explanation  Response: Verbalizes Understanding    Handouts, taught by Morris Siu PT at 8/7/2025  3:15 PM.  Learner: Patient  Readiness: Acceptance  Method: Explanation  Response: Verbalizes Understanding    Precautions, taught by Morris Siu PT at 8/7/2025  3:15 PM.  Learner: Patient  Readiness: Acceptance  Method: Explanation  Response: Verbalizes Understanding    Body Mechanics, taught by Morris Siu PT at 8/7/2025  3:15 PM.  Learner: Patient  Readiness: Acceptance  Method: Explanation  Response: Verbalizes Understanding    Home Exercise Program, taught by Morris Siu PT at 8/7/2025  3:15 PM.  Learner:  Patient  Readiness: Acceptance  Method: Explanation  Response: Verbalizes Understanding    Mobility Training, taught by Morris Siu, PT at 8/7/2025  3:15 PM.  Learner: Patient  Readiness: Acceptance  Method: Explanation  Response: Verbalizes Understanding    Education Comments  No comments found.        OP EDUCATION:       Encounter Problems       Encounter Problems (Active)       Mobility       LTG - Patient will navigate 4-6 steps with rails/device mod I with HR (Progressing)       Start:  08/07/25    Expected End:  08/21/25            STG - Patient will ambulate 50' or more mod I with LRAD (Progressing)       Start:  08/07/25    Expected End:  08/21/25               PT Transfers       STG - Patient will perform bed mobility with mod I and LRAD (Progressing)       Start:  08/07/25    Expected End:  08/21/25            STG - Patient will transfer sit to and from stand mod I with LRAD (Progressing)       Start:  08/07/25    Expected End:  08/21/25                        [1]   Past Medical History:  Diagnosis Date    Anemia     Off and on for years.    Anxiety     Sometimes.    Cataract 2023    Had surgery in my right eye about 2-3 years ago.    COVID-19     Had it once, a few years ago.    Fractures 1979    Left ankle, hardware from fall in my 20s.    Hypertension     Been on meds for several years    Insomnia     Pelvic mass     Large cyst and ovary removed several years ago    Primary osteoarthritis of right hip     Plan: Right Hip Total Arthroplasty ~ Anterior Approach 8/7/25

## 2025-08-07 NOTE — NURSING NOTE
Met with Patient and Care Partner at bedside- Patient is s/p Right Anterior Hip Replacement with Dr. Morgan. Discussion with patient included education on the following topics: [facility]JR Education: Wound Care (Bandage Care & Removal, Personal Hygiene & Infection Prevention), Post-Op Activity (Home PT Regimen, Movement Precautions, Assistive Equipment & 1-2hr Movement), Post-Op Precautions (Falls, Blood Clots & Constipation), Cold-Therapy (Ice vs. Cold Therapy Machines) , Methods for Symptom Management (Pain, Nausea, Swelling & Constipation), Importance of Post-op Prescriptions, How to Obtain Medication Refills, When to Resume Driving & Who to Call, Use of [employer] & Staff Contact Information, When to call 9-1-1, and When to call the Surgeon's Office. Patient attended joint class prior to surgery. Patient is able to verbalize understanding of class content/discussion. Contact information was provided to patient for support and assistance during the post-operative period. She was given My Medication Education tool as a reference for her discharge medications.

## 2025-08-07 NOTE — OP NOTE
Op Note    Preoperative Diagnosis: Right hip arthritis    Postoperative Diagnosis: Same    PROCEDURE:   Right total hip arthroplasty, anterior approach    Surgeon:  Abby Morgan MD     First Assist:  SA Robert Gandhi SA    Anesthesia Staff:  Anesthesiologist: Dash Monahan MD  C-AA: MARY Uribe    Anesthesia Type: Spinal     Specimens:  No specimens collected    Estimated Blood Loss:  200 mL    Implants:  Implant Name Type Inv. Item Serial No.  Lot No. LRB No. Used Action   EMPHASYS ACETABULAR SHELL THREE-HOLE 54mm CEMENTLESS   N/A DEPUY [id number] Right 1 Implanted   EMPHASYS POLYETHYLENE LINER NEUTRAL AOX 54mm 40mm   N/A DEPUY [id number] Right 1 Implanted   SCREW CANCELLOUS 6.5 X 25 - SN/A - [id number] Screw SCREW CANCELLOUS 6.5 X 25 N/A DEPUY [id number] Right 1 Implanted   STEM, FEMORAL, ACTIS COLLAR, STD, SIZE 4 - SN/A - [id number] Joint STEM, FEMORAL, ACTIS COLLAR, STD, SIZE 4 N/A DEPUY [id number] Right 1 Implanted   HEAD, FEMORAL, DELTA TS CERAMIC 12/14, 40MM +5.0 - SN/A - [id number] Joint HEAD, FEMORAL, DELTA TS CERAMIC 12/14, 40MM +5.0 N/A DEPUY [id number] Right 1 Implanted        Findings: End-stage arthritis of the right hip.  Large pincer lesion.  Large cam lesion.    Clinical History:  65-year-old female with hip degenerative joint disease who failed conservative measures and treatment, and wished to undergo a total hip arthroplasty electively. The patient at this time was explained the risks and benefits of the surgery including infection, bleeding, damage to neurovascular structures and potential for continued pain, continued weakness, worsening pain and worsening weakness, stiffness, stability, limb length discrepancy, fracture, infection requiring the resection arthroplasty and the need for revision surgery in the future, DVT prophylaxis and the risks for anesthetic complications and pulmonary complications and even death. The patient understood at this point under the risks and  wished to proceed.    Description of procedure:  The patient was seen in the preoperative holding area and the appropriate side lower extremity was marked. The patient was brought to the Operating Room and placed supine on the Operating Room table. A preliminary anesthesia time out was then taken.    The neuraxial block anesthesia was initiated. The patient was placed supine. The antibiotics were administered within thirty minutes of incision time. The patient was then placed on the HANA table with the bilateral lower extremities in traction boots and the body stabilized against the perineal post.  At this point, the patient's surgical area was prepped and draped in the standard, sterile fashion.    A surgical time out was performed to confirm the correct side, procedure, and name. The patient received IV antibiotics and IV TXA. An approximate 8 cm longitudinal incision was made starting superolateral and distal to anterior, superior iliac spine. With a slight proximal medial to distal lateral angulation, the incision was carried down through the subcutaneous tissues down to the level of the tensor fascia marquita. With the Grace retractors in place, the tensor fascia marquita was divided. Blunt dissection out of the fascia was done to identify the interval between the tensor fascia marquita and the sartorius. An aufranc was placed proximally on the ilium. Once this was done with deep Grace retractors, blunt dissection was used to identify the ascending branch of the lateral femoral circumflex artery and this was cauterized.    Next, the fat pad between the gluteus medius and the rectus was identified and dissected to the deep interval. At this point, a Rosario was used to elevate the iliocapsularis off the anterior capsule and an Aufranc retractor was placed along the medial neck. The reflected head of the rectus femoris was identified and preserved. At this point, a capsulotomy was performed along the axis of iliocapsularis and  then along the superior border of lateralis and then proximally along the intertrochanteric ridge to the saddle of the femoral neck. The capsule was tagged with two # 2 ethibond sutures. The Aufranc retractors were now placed intracapsular and the head-neck junction was exposed.  Under direct visualization, a subcapital osteotomy was performed with a sagittal saw and the head was removed with a corkscrew.  The capsule was then released posterosuperiorly being careful to identify the interval between minimus and capsule and releasing around the superior capsular insertion from the femur.     The acetabulum was now visualized with the placement of retractors: Aufranc retractor over the posterior acetabulum, a C-retractor over anterior acetabulum. An episiotomy was made in the inferior capsule and straight Homann placed inferior, posterior acetabulum. At this point, the bovie was used to excise the labrum and the cotyloid fossa.     At this point, with a reamer, the acetabulum was medialized and then deepened sequentially up to the final size reamer. Fluoroscopy was used to assess the position of the reamer. Next, the final 54 mm acetabular component was impacted into place. This was done with the assistance of fluoroscopic guidance and position was confirmed anatomically. A 25 mm screw was placed for supplemental fixation.  Once this was accomplished, the final neutral 40 mm polyethylene liner was impacted into place. The osteophytes were removed using an osteotome.     Next, the attention was turned to the femur. The acetabular retractors were removed. A HANA hook was placed around the proximal femur at the level of the G max insertion and lesser trochanter. The curved pointed Hohmann was placed over the greater trochanter laterally and a femoral retractor placed at the inferomedial neck. The conjoint tendon was visualized and released with the bovie cautery to expose the inner aspect of the greater trochanter. The  piriformis and obturator externus tendons were preserved. The leg was then hyperextended and adducted slightly.  With this done, the midas carlos alberto followed by canal finder was used to access in the intramedullary canal. This was followed by sequential broaching up to the final size consistent with the preoperative template. With the broach left in place and after calcar planning, the neck and trials were placed. The hip was reduced and found to be stable. Leg length, offset and implant size and position were assessed clinically and radiographically, and these were found to be consistent with the preoperative template.     Afterwards, the hip was dislocated. The trial components were removed, and the final size stem was placed in the canal and impacted into place and was found to be stable. The actual 40+5 mm head was now impacted into place after cleaning and drying the trunnion. The hip was reduced with gentle traction and internal rotation. Final leg length was measured on X-ray.    At this point, the hip was copiously irrigated. A periarticular injection was performed. Then the tagging ethibond sutures were tied together to reapproximate the capsulotomy and a few #2 ethibonds were used to complete closure of the capsulotomy. Copious irrigation was used throughout the closure.  The fascia overlying the TFL was now closed using 0 vicryl sutures. The remainder of the wound was closed in layers using 0-vicryl, 3-0 vicryl and skin closed using 3-0 stratafix and dermabond for skin. Sterile mepilex dressings were applied and the patient was transferred from the operating room table to the bed and then transferred to the Recovery Room in stable condition. All sponge, needle, and instrument counts were correct at the end of the procedure. There were no complications. A surgical debrief was performed at the end of the case.    Post-operative Plan:  Patient will be placed on anterior hip precautions and will mobilize with  physical therapy. They will be started on aspirin for DVT prophylaxis along with mechanical compression devices. They will be discharged from the hospital when they have reached rehabilitation goals and their pain is controlled.    Attestation Statement:  I was present for and performed all critical portions of the procedure.    Abby Morgan MD    This note was transcribed with dictation software.  Please excuse any typographical errors, program misunderstandings leading to inadvertent insertions or deletions of inappropriate wording, pronoun errors and other unintentional transcription errors not noticed on proof-reading.

## 2025-08-08 ENCOUNTER — DOCUMENTATION (OUTPATIENT)
Dept: HOME HEALTH SERVICES | Facility: HOME HEALTH | Age: 65
End: 2025-08-08
Payer: MEDICARE

## 2025-08-08 ASSESSMENT — PAIN SCALES - GENERAL: PAINLEVEL_OUTOF10: 2

## 2025-08-08 NOTE — HH CARE COORDINATION
Home Care received a Referral for Physical Therapy and Occupational Therapy. We have processed the referral for a Start of Care on 8.09.25.     If you have any questions or concerns, please feel free to contact us at [phone]. Follow the prompts, enter your five digit zip code, and you will be directed to your care team on [facility] 1.

## 2025-08-09 ENCOUNTER — HOME CARE VISIT (OUTPATIENT)
Dept: HOME HEALTH SERVICES | Facility: HOME HEALTH | Age: 65
End: 2025-08-09
Payer: MEDICARE

## 2025-08-09 VITALS
HEART RATE: 69 BPM | TEMPERATURE: 95.9 F | SYSTOLIC BLOOD PRESSURE: 110 MMHG | DIASTOLIC BLOOD PRESSURE: 65 MMHG | OXYGEN SATURATION: 99 %

## 2025-08-09 PROCEDURE — G0151 HHCP-SERV OF PT,EA 15 MIN: HCPCS | Mod: HHH

## 2025-08-09 SDOH — HEALTH STABILITY: PHYSICAL HEALTH: EXERCISE COMMENTS: AQ RLE     SUGGESTED  ICE AFTER EXS AT ALL TIMES.

## 2025-08-09 SDOH — HEALTH STABILITY: PHYSICAL HEALTH
EXERCISE COMMENTS: HAD HO FROM HOSPITAL DC.. INSTRUCTED PT IN THE FOLLOWING EXS TODAY.. BEGINNING WITH 10 REPS DUE TO FATIGUE AND INCREASING TO 20 REPS.    SITTING RLE LAQ  SUPINE..  BIL ANKLE PUMPS, GS AND QS  RLE HS STOP BEFORE PAIN  RLE HIP ABD STOP PRIOR TO PAIN  S

## 2025-08-09 SDOH — HEALTH STABILITY: PHYSICAL HEALTH: EXERCISE TYPE: ANTERIOR APPROACH THR

## 2025-08-09 ASSESSMENT — ENCOUNTER SYMPTOMS
MUSCLE WEAKNESS: 1
PAIN: 1
PAIN LOCATION - PAIN FREQUENCY: CONSTANT
HIGHEST PAIN SEVERITY IN PAST 24 HOURS: 7/10
LOWEST PAIN SEVERITY IN PAST 24 HOURS: 1/10
PAIN LOCATION - RELIEVING FACTORS: ICE MEDS
SLEEP QUALITY: FAIR
OCCASIONAL FEELINGS OF UNSTEADINESS: 0
LOWER EXTREMITY EDEMA: 1
PAIN LOCATION: RIGHT HIP
ANGER WITHIN DEFINED LIMITS: 1
DEPRESSION: 0
SUBJECTIVE PAIN PROGRESSION: UNCHANGED
LIMITED RANGE OF MOTION: 1
PERSON REPORTING PAIN: PATIENT
LOSS OF SENSATION IN FEET: 0
AGGRESSION WITHIN DEFINED LIMITS: 1

## 2025-08-09 ASSESSMENT — ACTIVITIES OF DAILY LIVING (ADL)
OASIS_M1830: 03
PHYSICAL TRANSFERS ASSESSED: 1
AMBULATION ASSISTANCE: 1
AMBULATION_DISTANCE/DURATION_TOLERATED: 50 FEET X 2
AMBULATION ASSISTANCE ON FLAT SURFACES: 1
ENTERING_EXITING_HOME: SUPERVISION

## 2025-08-09 ASSESSMENT — PAIN SCALES - PAIN ASSESSMENT IN ADVANCED DEMENTIA (PAINAD)
BODYLANGUAGE: 0 - RELAXED.
FACIALEXPRESSION: 0 - SMILING OR INEXPRESSIVE.
FACIALEXPRESSION: 0
CONSOLABILITY: 0
BODYLANGUAGE: 0
TOTALSCORE: 0
NEGVOCALIZATION: 0 - NONE.
CONSOLABILITY: 0 - NO NEED TO CONSOLE.
NEGVOCALIZATION: 0
BREATHING: 0

## 2025-08-11 ENCOUNTER — TELEPHONE (OUTPATIENT)
Dept: ORTHOPEDIC SURGERY | Facility: HOSPITAL | Age: 65
End: 2025-08-11
Payer: MEDICARE

## 2025-08-11 ENCOUNTER — HOME CARE VISIT (OUTPATIENT)
Dept: HOME HEALTH SERVICES | Facility: HOME HEALTH | Age: 65
End: 2025-08-11
Payer: MEDICARE

## 2025-08-11 PROCEDURE — G0152 HHCP-SERV OF OT,EA 15 MIN: HCPCS | Mod: HHH

## 2025-08-11 ASSESSMENT — ACTIVITIES OF DAILY LIVING (ADL)
BATHING_CURRENT_FUNCTION: SUPERVISION
BATHING ASSESSED: 1
DRESSING_LB_CURRENT_FUNCTION: SUPERVISION
TOILETING: SUPERVISION
TOILETING: 1
PREPARING MEALS: INDEPENDENT

## 2025-08-11 ASSESSMENT — ENCOUNTER SYMPTOMS
PAIN LOCATION: RIGHT HIP
PERSON REPORTING PAIN: PATIENT
SUBJECTIVE PAIN PROGRESSION: GRADUALLY IMPROVING
PAIN: 1
LOWEST PAIN SEVERITY IN PAST 24 HOURS: 4/10
HIGHEST PAIN SEVERITY IN PAST 24 HOURS: 8/10

## 2025-08-12 ENCOUNTER — HOME CARE VISIT (OUTPATIENT)
Dept: HOME HEALTH SERVICES | Facility: HOME HEALTH | Age: 65
End: 2025-08-12
Payer: MEDICARE

## 2025-08-12 VITALS — SYSTOLIC BLOOD PRESSURE: 144 MMHG | DIASTOLIC BLOOD PRESSURE: 95 MMHG | RESPIRATION RATE: 16 BRPM | HEART RATE: 123 BPM

## 2025-08-12 PROCEDURE — G0151 HHCP-SERV OF PT,EA 15 MIN: HCPCS | Mod: HHH

## 2025-08-12 RX ORDER — SCOPOLAMINE 1 MG/3D
PATCH, EXTENDED RELEASE TRANSDERMAL
COMMUNITY
End: 2025-08-15 | Stop reason: HOSPADM

## 2025-08-12 ASSESSMENT — PAIN DESCRIPTION - PAIN TYPE: TYPE: SURGICAL PAIN

## 2025-08-13 ENCOUNTER — APPOINTMENT (OUTPATIENT)
Dept: CARDIOLOGY | Facility: HOSPITAL | Age: 65
DRG: 390 | End: 2025-08-13
Payer: MEDICARE

## 2025-08-13 ENCOUNTER — APPOINTMENT (OUTPATIENT)
Dept: RADIOLOGY | Facility: HOSPITAL | Age: 65
DRG: 390 | End: 2025-08-13
Payer: MEDICARE

## 2025-08-13 ENCOUNTER — HOSPITAL ENCOUNTER (INPATIENT)
Facility: HOSPITAL | Age: 65
LOS: 2 days | Discharge: HOME | DRG: 390 | End: 2025-08-15
Attending: EMERGENCY MEDICINE | Admitting: INTERNAL MEDICINE
Payer: MEDICARE

## 2025-08-13 DIAGNOSIS — K56.609 SBO (SMALL BOWEL OBSTRUCTION) (MULTI): Primary | ICD-10-CM

## 2025-08-13 LAB
ALBUMIN SERPL BCP-MCNC: 3.7 G/DL (ref 3.4–5)
ALP SERPL-CCNC: 37 U/L (ref 33–136)
ALT SERPL W P-5'-P-CCNC: 20 U/L (ref 7–45)
ANION GAP SERPL CALC-SCNC: 13 MMOL/L (ref 10–20)
AST SERPL W P-5'-P-CCNC: 29 U/L (ref 9–39)
BASOPHILS # BLD AUTO: 0.01 X10*3/UL (ref 0–0.1)
BASOPHILS NFR BLD AUTO: 0.3 %
BILIRUB SERPL-MCNC: 0.6 MG/DL (ref 0–1.2)
BUN SERPL-MCNC: 21 MG/DL (ref 6–23)
CALCIUM SERPL-MCNC: 8.8 MG/DL (ref 8.6–10.3)
CHLORIDE SERPL-SCNC: 99 MMOL/L (ref 98–107)
CO2 SERPL-SCNC: 29 MMOL/L (ref 21–32)
CREAT SERPL-MCNC: 0.95 MG/DL (ref 0.5–1.05)
EGFRCR SERPLBLD CKD-EPI 2021: 66 ML/MIN/1.73M*2
EOSINOPHIL # BLD AUTO: 0.05 X10*3/UL (ref 0–0.7)
EOSINOPHIL NFR BLD AUTO: 1.3 %
ERYTHROCYTE [DISTWIDTH] IN BLOOD BY AUTOMATED COUNT: 12.4 % (ref 11.5–14.5)
GLUCOSE SERPL-MCNC: 102 MG/DL (ref 74–99)
HCT VFR BLD AUTO: 29.1 % (ref 36–46)
HGB BLD-MCNC: 9.5 G/DL (ref 12–16)
IMM GRANULOCYTES # BLD AUTO: 0.02 X10*3/UL (ref 0–0.7)
IMM GRANULOCYTES NFR BLD AUTO: 0.5 % (ref 0–0.9)
LIPASE SERPL-CCNC: 44 U/L (ref 9–82)
LYMPHOCYTES # BLD AUTO: 0.65 X10*3/UL (ref 1.2–4.8)
LYMPHOCYTES NFR BLD AUTO: 17.2 %
MCH RBC QN AUTO: 32.8 PG (ref 26–34)
MCHC RBC AUTO-ENTMCNC: 32.6 G/DL (ref 32–36)
MCV RBC AUTO: 100 FL (ref 80–100)
MONOCYTES # BLD AUTO: 0.62 X10*3/UL (ref 0.1–1)
MONOCYTES NFR BLD AUTO: 16.4 %
NEUTROPHILS # BLD AUTO: 2.42 X10*3/UL (ref 1.2–7.7)
NEUTROPHILS NFR BLD AUTO: 64.3 %
NRBC BLD-RTO: 0 /100 WBCS (ref 0–0)
PLATELET # BLD AUTO: 294 X10*3/UL (ref 150–450)
POTASSIUM SERPL-SCNC: 4 MMOL/L (ref 3.5–5.3)
PROT SERPL-MCNC: 6.7 G/DL (ref 6.4–8.2)
RBC # BLD AUTO: 2.9 X10*6/UL (ref 4–5.2)
SODIUM SERPL-SCNC: 137 MMOL/L (ref 136–145)
WBC # BLD AUTO: 3.8 X10*3/UL (ref 4.4–11.3)

## 2025-08-13 PROCEDURE — 99222 1ST HOSP IP/OBS MODERATE 55: CPT

## 2025-08-13 PROCEDURE — 84075 ASSAY ALKALINE PHOSPHATASE: CPT | Performed by: EMERGENCY MEDICINE

## 2025-08-13 PROCEDURE — 83690 ASSAY OF LIPASE: CPT | Performed by: EMERGENCY MEDICINE

## 2025-08-13 PROCEDURE — 74177 CT ABD & PELVIS W/CONTRAST: CPT | Performed by: STUDENT IN AN ORGANIZED HEALTH CARE EDUCATION/TRAINING PROGRAM

## 2025-08-13 PROCEDURE — 99285 EMERGENCY DEPT VISIT HI MDM: CPT | Mod: 25 | Performed by: EMERGENCY MEDICINE

## 2025-08-13 PROCEDURE — 71045 X-RAY EXAM CHEST 1 VIEW: CPT

## 2025-08-13 PROCEDURE — 93005 ELECTROCARDIOGRAM TRACING: CPT

## 2025-08-13 PROCEDURE — 85025 COMPLETE CBC W/AUTO DIFF WBC: CPT | Performed by: EMERGENCY MEDICINE

## 2025-08-13 PROCEDURE — 1100000001 HC PRIVATE ROOM DAILY

## 2025-08-13 PROCEDURE — 74177 CT ABD & PELVIS W/CONTRAST: CPT

## 2025-08-13 PROCEDURE — 96375 TX/PRO/DX INJ NEW DRUG ADDON: CPT

## 2025-08-13 PROCEDURE — 2500000004 HC RX 250 GENERAL PHARMACY W/ HCPCS (ALT 636 FOR OP/ED): Performed by: NURSE PRACTITIONER

## 2025-08-13 PROCEDURE — 99223 1ST HOSP IP/OBS HIGH 75: CPT | Performed by: NURSE PRACTITIONER

## 2025-08-13 PROCEDURE — 96361 HYDRATE IV INFUSION ADD-ON: CPT

## 2025-08-13 PROCEDURE — 96374 THER/PROPH/DIAG INJ IV PUSH: CPT

## 2025-08-13 PROCEDURE — 2500000004 HC RX 250 GENERAL PHARMACY W/ HCPCS (ALT 636 FOR OP/ED): Performed by: EMERGENCY MEDICINE

## 2025-08-13 PROCEDURE — 36415 COLL VENOUS BLD VENIPUNCTURE: CPT | Performed by: EMERGENCY MEDICINE

## 2025-08-13 PROCEDURE — 71045 X-RAY EXAM CHEST 1 VIEW: CPT | Performed by: RADIOLOGY

## 2025-08-13 PROCEDURE — 2550000001 HC RX 255 CONTRASTS: Performed by: EMERGENCY MEDICINE

## 2025-08-13 RX ORDER — KETOROLAC TROMETHAMINE 30 MG/ML
15 INJECTION, SOLUTION INTRAMUSCULAR; INTRAVENOUS ONCE
Status: COMPLETED | OUTPATIENT
Start: 2025-08-13 | End: 2025-08-13

## 2025-08-13 RX ORDER — MIDAZOLAM HYDROCHLORIDE 1 MG/ML
2 INJECTION, SOLUTION INTRAMUSCULAR; INTRAVENOUS ONCE
Status: COMPLETED | OUTPATIENT
Start: 2025-08-13 | End: 2025-08-13

## 2025-08-13 RX ORDER — ENOXAPARIN SODIUM 100 MG/ML
40 INJECTION SUBCUTANEOUS EVERY 24 HOURS
Status: DISCONTINUED | OUTPATIENT
Start: 2025-08-13 | End: 2025-08-15 | Stop reason: HOSPADM

## 2025-08-13 RX ORDER — PANTOPRAZOLE SODIUM 40 MG/10ML
40 INJECTION, POWDER, LYOPHILIZED, FOR SOLUTION INTRAVENOUS DAILY
Status: DISCONTINUED | OUTPATIENT
Start: 2025-08-13 | End: 2025-08-15 | Stop reason: HOSPADM

## 2025-08-13 RX ORDER — MORPHINE SULFATE 4 MG/ML
4 INJECTION, SOLUTION INTRAMUSCULAR; INTRAVENOUS EVERY 4 HOURS PRN
Status: DISCONTINUED | OUTPATIENT
Start: 2025-08-13 | End: 2025-08-15 | Stop reason: HOSPADM

## 2025-08-13 RX ORDER — KETOROLAC TROMETHAMINE 30 MG/ML
15 INJECTION, SOLUTION INTRAMUSCULAR; INTRAVENOUS EVERY 6 HOURS
Status: COMPLETED | OUTPATIENT
Start: 2025-08-13 | End: 2025-08-14

## 2025-08-13 RX ORDER — LISINOPRIL 20 MG/1
20 TABLET ORAL DAILY
Status: DISCONTINUED | OUTPATIENT
Start: 2025-08-14 | End: 2025-08-15 | Stop reason: HOSPADM

## 2025-08-13 RX ORDER — MORPHINE SULFATE 2 MG/ML
2 INJECTION, SOLUTION INTRAMUSCULAR; INTRAVENOUS EVERY 4 HOURS PRN
Status: DISCONTINUED | OUTPATIENT
Start: 2025-08-13 | End: 2025-08-15 | Stop reason: HOSPADM

## 2025-08-13 RX ORDER — HYDROCHLOROTHIAZIDE 12.5 MG/1
12.5 TABLET ORAL DAILY
Status: DISCONTINUED | OUTPATIENT
Start: 2025-08-13 | End: 2025-08-15 | Stop reason: HOSPADM

## 2025-08-13 RX ORDER — NAPROXEN SODIUM 220 MG/1
81 TABLET, FILM COATED ORAL 2 TIMES DAILY
Status: DISCONTINUED | OUTPATIENT
Start: 2025-08-13 | End: 2025-08-15 | Stop reason: HOSPADM

## 2025-08-13 RX ORDER — SODIUM CHLORIDE, SODIUM LACTATE, POTASSIUM CHLORIDE, CALCIUM CHLORIDE 600; 310; 30; 20 MG/100ML; MG/100ML; MG/100ML; MG/100ML
75 INJECTION, SOLUTION INTRAVENOUS CONTINUOUS
Status: ACTIVE | OUTPATIENT
Start: 2025-08-13 | End: 2025-08-14

## 2025-08-13 RX ORDER — ONDANSETRON HYDROCHLORIDE 2 MG/ML
4 INJECTION, SOLUTION INTRAVENOUS EVERY 6 HOURS PRN
Status: DISCONTINUED | OUTPATIENT
Start: 2025-08-13 | End: 2025-08-15 | Stop reason: HOSPADM

## 2025-08-13 RX ORDER — ONDANSETRON HYDROCHLORIDE 2 MG/ML
4 INJECTION, SOLUTION INTRAVENOUS ONCE
Status: COMPLETED | OUTPATIENT
Start: 2025-08-13 | End: 2025-08-13

## 2025-08-13 RX ORDER — MORPHINE SULFATE 4 MG/ML
4 INJECTION, SOLUTION INTRAMUSCULAR; INTRAVENOUS ONCE
Status: COMPLETED | OUTPATIENT
Start: 2025-08-13 | End: 2025-08-13

## 2025-08-13 RX ORDER — DIATRIZOATE MEGLUMINE AND DIATRIZOATE SODIUM 660; 100 MG/ML; MG/ML
30 SOLUTION ORAL; RECTAL ONCE
Status: COMPLETED | OUTPATIENT
Start: 2025-08-14 | End: 2025-08-14

## 2025-08-13 RX ADMIN — KETOROLAC TROMETHAMINE 15 MG: 30 INJECTION, SOLUTION INTRAMUSCULAR at 15:10

## 2025-08-13 RX ADMIN — KETOROLAC TROMETHAMINE 15 MG: 30 INJECTION, SOLUTION INTRAMUSCULAR at 22:25

## 2025-08-13 RX ADMIN — MORPHINE SULFATE 4 MG: 4 INJECTION, SOLUTION INTRAMUSCULAR; INTRAVENOUS at 16:42

## 2025-08-13 RX ADMIN — ENOXAPARIN SODIUM 40 MG: 100 INJECTION SUBCUTANEOUS at 22:26

## 2025-08-13 RX ADMIN — PANTOPRAZOLE SODIUM 40 MG: 40 INJECTION, POWDER, FOR SOLUTION INTRAVENOUS at 22:26

## 2025-08-13 RX ADMIN — MIDAZOLAM HYDROCHLORIDE 2 MG: 1 INJECTION, SOLUTION INTRAMUSCULAR; INTRAVENOUS at 15:09

## 2025-08-13 RX ADMIN — SODIUM CHLORIDE 1000 ML: 0.9 INJECTION, SOLUTION INTRAVENOUS at 15:09

## 2025-08-13 RX ADMIN — IOHEXOL 75 ML: 350 INJECTION, SOLUTION INTRAVENOUS at 15:48

## 2025-08-13 RX ADMIN — MORPHINE SULFATE 4 MG: 4 INJECTION, SOLUTION INTRAMUSCULAR; INTRAVENOUS at 20:49

## 2025-08-13 RX ADMIN — SODIUM CHLORIDE, SODIUM LACTATE, POTASSIUM CHLORIDE, AND CALCIUM CHLORIDE 75 ML/HR: .6; .31; .03; .02 INJECTION, SOLUTION INTRAVENOUS at 22:27

## 2025-08-13 RX ADMIN — ONDANSETRON 4 MG: 2 INJECTION, SOLUTION INTRAMUSCULAR; INTRAVENOUS at 15:10

## 2025-08-13 ASSESSMENT — PAIN SCALES - GENERAL
PAINLEVEL_OUTOF10: 8
PAINLEVEL_OUTOF10: 9
PAINLEVEL_OUTOF10: 9
PAINLEVEL_OUTOF10: 8
PAINLEVEL_OUTOF10: 0 - NO PAIN
PAINLEVEL_OUTOF10: 6

## 2025-08-13 ASSESSMENT — PAIN - FUNCTIONAL ASSESSMENT
PAIN_FUNCTIONAL_ASSESSMENT: 0-10

## 2025-08-13 ASSESSMENT — ENCOUNTER SYMPTOMS
MUSCULOSKELETAL NEGATIVE: 1
ABDOMINAL DISTENTION: 1
ABDOMINAL PAIN: 1
CONSTITUTIONAL NEGATIVE: 1
NEUROLOGICAL NEGATIVE: 1
VOMITING: 1
NAUSEA: 1
RESPIRATORY NEGATIVE: 1

## 2025-08-13 ASSESSMENT — PAIN DESCRIPTION - LOCATION
LOCATION: ABDOMEN
LOCATION: ABDOMEN
LOCATION: FACE
LOCATION: THROAT

## 2025-08-13 ASSESSMENT — PAIN DESCRIPTION - ORIENTATION
ORIENTATION: RIGHT
ORIENTATION: MID

## 2025-08-13 ASSESSMENT — PAIN DESCRIPTION - PROGRESSION
CLINICAL_PROGRESSION: NOT CHANGED
CLINICAL_PROGRESSION: GRADUALLY WORSENING

## 2025-08-13 ASSESSMENT — PAIN DESCRIPTION - DESCRIPTORS
DESCRIPTORS: SORE;ACHING
DESCRIPTORS: ACHING

## 2025-08-14 ENCOUNTER — APPOINTMENT (OUTPATIENT)
Dept: RADIOLOGY | Facility: HOSPITAL | Age: 65
DRG: 390 | End: 2025-08-14
Payer: MEDICARE

## 2025-08-14 LAB
ANION GAP SERPL CALC-SCNC: 11 MMOL/L (ref 10–20)
ATRIAL RATE: 107 BPM
BASOPHILS # BLD AUTO: 0.01 X10*3/UL (ref 0–0.1)
BASOPHILS NFR BLD AUTO: 0.3 %
BUN SERPL-MCNC: 24 MG/DL (ref 6–23)
CALCIUM SERPL-MCNC: 8 MG/DL (ref 8.6–10.3)
CHLORIDE SERPL-SCNC: 105 MMOL/L (ref 98–107)
CO2 SERPL-SCNC: 28 MMOL/L (ref 21–32)
CREAT SERPL-MCNC: 0.93 MG/DL (ref 0.5–1.05)
EGFRCR SERPLBLD CKD-EPI 2021: 68 ML/MIN/1.73M*2
EOSINOPHIL # BLD AUTO: 0.15 X10*3/UL (ref 0–0.7)
EOSINOPHIL NFR BLD AUTO: 4.3 %
ERYTHROCYTE [DISTWIDTH] IN BLOOD BY AUTOMATED COUNT: 12.5 % (ref 11.5–14.5)
GLUCOSE SERPL-MCNC: 76 MG/DL (ref 74–99)
HCT VFR BLD AUTO: 23.4 % (ref 36–46)
HGB BLD-MCNC: 7.7 G/DL (ref 12–16)
IMM GRANULOCYTES # BLD AUTO: 0.02 X10*3/UL (ref 0–0.7)
IMM GRANULOCYTES NFR BLD AUTO: 0.6 % (ref 0–0.9)
IRON SATN MFR SERPL: 17 % (ref 25–45)
IRON SERPL-MCNC: 43 UG/DL (ref 35–150)
LYMPHOCYTES # BLD AUTO: 1.28 X10*3/UL (ref 1.2–4.8)
LYMPHOCYTES NFR BLD AUTO: 36.7 %
MCH RBC QN AUTO: 32.5 PG (ref 26–34)
MCHC RBC AUTO-ENTMCNC: 32.9 G/DL (ref 32–36)
MCV RBC AUTO: 99 FL (ref 80–100)
MONOCYTES # BLD AUTO: 0.38 X10*3/UL (ref 0.1–1)
MONOCYTES NFR BLD AUTO: 10.9 %
NEUTROPHILS # BLD AUTO: 1.65 X10*3/UL (ref 1.2–7.7)
NEUTROPHILS NFR BLD AUTO: 47.2 %
NRBC BLD-RTO: 0 /100 WBCS (ref 0–0)
P AXIS: 49 DEGREES
P OFFSET: 187 MS
P ONSET: 144 MS
PLATELET # BLD AUTO: 271 X10*3/UL (ref 150–450)
POTASSIUM SERPL-SCNC: 4.3 MMOL/L (ref 3.5–5.3)
PR INTERVAL: 138 MS
Q ONSET: 213 MS
QRS COUNT: 18 BEATS
QRS DURATION: 72 MS
QT INTERVAL: 318 MS
QTC CALCULATION(BAZETT): 424 MS
QTC FREDERICIA: 385 MS
R AXIS: -16 DEGREES
RBC # BLD AUTO: 2.37 X10*6/UL (ref 4–5.2)
SODIUM SERPL-SCNC: 140 MMOL/L (ref 136–145)
T AXIS: 24 DEGREES
T OFFSET: 372 MS
TIBC SERPL-MCNC: 251 UG/DL (ref 240–445)
UIBC SERPL-MCNC: 208 UG/DL (ref 110–370)
VENTRICULAR RATE: 107 BPM
VIT B12 SERPL-MCNC: 511 PG/ML (ref 211–911)
WBC # BLD AUTO: 3.5 X10*3/UL (ref 4.4–11.3)

## 2025-08-14 PROCEDURE — 2500000004 HC RX 250 GENERAL PHARMACY W/ HCPCS (ALT 636 FOR OP/ED): Performed by: NURSE PRACTITIONER

## 2025-08-14 PROCEDURE — 2500000001 HC RX 250 WO HCPCS SELF ADMINISTERED DRUGS (ALT 637 FOR MEDICARE OP)

## 2025-08-14 PROCEDURE — 74018 RADEX ABDOMEN 1 VIEW: CPT | Performed by: RADIOLOGY

## 2025-08-14 PROCEDURE — 99232 SBSQ HOSP IP/OBS MODERATE 35: CPT

## 2025-08-14 PROCEDURE — 36415 COLL VENOUS BLD VENIPUNCTURE: CPT | Performed by: NURSE PRACTITIONER

## 2025-08-14 PROCEDURE — 1100000001 HC PRIVATE ROOM DAILY

## 2025-08-14 PROCEDURE — 2500000001 HC RX 250 WO HCPCS SELF ADMINISTERED DRUGS (ALT 637 FOR MEDICARE OP): Performed by: INTERNAL MEDICINE

## 2025-08-14 PROCEDURE — 99232 SBSQ HOSP IP/OBS MODERATE 35: CPT | Performed by: SURGERY

## 2025-08-14 PROCEDURE — 97161 PT EVAL LOW COMPLEX 20 MIN: CPT | Mod: GP

## 2025-08-14 PROCEDURE — 83540 ASSAY OF IRON: CPT | Performed by: INTERNAL MEDICINE

## 2025-08-14 PROCEDURE — 74018 RADEX ABDOMEN 1 VIEW: CPT

## 2025-08-14 PROCEDURE — 82746 ASSAY OF FOLIC ACID SERUM: CPT | Mod: AHULAB | Performed by: INTERNAL MEDICINE

## 2025-08-14 PROCEDURE — 80048 BASIC METABOLIC PNL TOTAL CA: CPT | Performed by: NURSE PRACTITIONER

## 2025-08-14 PROCEDURE — 85025 COMPLETE CBC W/AUTO DIFF WBC: CPT | Performed by: NURSE PRACTITIONER

## 2025-08-14 PROCEDURE — 2550000001 HC RX 255 CONTRASTS: Performed by: NURSE PRACTITIONER

## 2025-08-14 PROCEDURE — 82607 VITAMIN B-12: CPT | Mod: AHULAB | Performed by: INTERNAL MEDICINE

## 2025-08-14 PROCEDURE — 2500000004 HC RX 250 GENERAL PHARMACY W/ HCPCS (ALT 636 FOR OP/ED): Performed by: INTERNAL MEDICINE

## 2025-08-14 PROCEDURE — 99233 SBSQ HOSP IP/OBS HIGH 50: CPT | Performed by: INTERNAL MEDICINE

## 2025-08-14 RX ORDER — ACETAMINOPHEN 160 MG/5ML
650 SOLUTION ORAL EVERY 6 HOURS PRN
Status: DISCONTINUED | OUTPATIENT
Start: 2025-08-14 | End: 2025-08-15 | Stop reason: HOSPADM

## 2025-08-14 RX ORDER — BUTALBITAL, ACETAMINOPHEN AND CAFFEINE 50; 325; 40 MG/1; MG/1; MG/1
1 TABLET ORAL ONCE
Status: COMPLETED | OUTPATIENT
Start: 2025-08-14 | End: 2025-08-14

## 2025-08-14 RX ORDER — KETOROLAC TROMETHAMINE 30 MG/ML
15 INJECTION, SOLUTION INTRAMUSCULAR; INTRAVENOUS EVERY 6 HOURS PRN
Status: DISCONTINUED | OUTPATIENT
Start: 2025-08-14 | End: 2025-08-15 | Stop reason: HOSPADM

## 2025-08-14 RX ADMIN — DIATRIZOATE MEGLUMINE AND DIATRIZOATE SODIUM 30 ML: 660; 100 LIQUID ORAL; RECTAL at 09:00

## 2025-08-14 RX ADMIN — KETOROLAC TROMETHAMINE 15 MG: 30 INJECTION, SOLUTION INTRAMUSCULAR at 21:49

## 2025-08-14 RX ADMIN — BUTALBITAL, ACETAMINOPHEN, AND CAFFEINE 1 TABLET: 325; 50; 40 TABLET ORAL at 10:25

## 2025-08-14 RX ADMIN — ACETAMINOPHEN 650 MG: 650 SOLUTION ORAL at 15:54

## 2025-08-14 RX ADMIN — ENOXAPARIN SODIUM 40 MG: 100 INJECTION SUBCUTANEOUS at 19:00

## 2025-08-14 RX ADMIN — KETOROLAC TROMETHAMINE 15 MG: 30 INJECTION, SOLUTION INTRAMUSCULAR at 04:34

## 2025-08-14 RX ADMIN — PANTOPRAZOLE SODIUM 40 MG: 40 INJECTION, POWDER, FOR SOLUTION INTRAVENOUS at 09:39

## 2025-08-14 SDOH — SOCIAL STABILITY: SOCIAL INSECURITY
WITHIN THE LAST YEAR, HAVE YOU BEEN KICKED, HIT, SLAPPED, OR OTHERWISE PHYSICALLY HURT BY YOUR PARTNER OR EX-PARTNER?: NO

## 2025-08-14 SDOH — ECONOMIC STABILITY: FOOD INSECURITY: HOW HARD IS IT FOR YOU TO PAY FOR THE VERY BASICS LIKE FOOD, HOUSING, MEDICAL CARE, AND HEATING?: NOT HARD AT ALL

## 2025-08-14 SDOH — SOCIAL STABILITY: SOCIAL INSECURITY: DOES ANYONE TRY TO KEEP YOU FROM HAVING/CONTACTING OTHER FRIENDS OR DOING THINGS OUTSIDE YOUR HOME?: NO

## 2025-08-14 SDOH — SOCIAL STABILITY: SOCIAL INSECURITY: ARE THERE ANY APPARENT SIGNS OF INJURIES/BEHAVIORS THAT COULD BE RELATED TO ABUSE/NEGLECT?: NO

## 2025-08-14 SDOH — SOCIAL STABILITY: SOCIAL INSECURITY: WITHIN THE LAST YEAR, HAVE YOU BEEN HUMILIATED OR EMOTIONALLY ABUSED IN OTHER WAYS BY YOUR PARTNER OR EX-PARTNER?: NO

## 2025-08-14 SDOH — ECONOMIC STABILITY: FOOD INSECURITY: WITHIN THE PAST 12 MONTHS, YOU WORRIED THAT YOUR FOOD WOULD RUN OUT BEFORE YOU GOT THE MONEY TO BUY MORE.: NEVER TRUE

## 2025-08-14 SDOH — SOCIAL STABILITY: SOCIAL INSECURITY: WITHIN THE LAST YEAR, HAVE YOU BEEN AFRAID OF YOUR PARTNER OR EX-PARTNER?: NO

## 2025-08-14 SDOH — ECONOMIC STABILITY: HOUSING INSECURITY: IN THE PAST 12 MONTHS, HOW MANY TIMES HAVE YOU MOVED WHERE YOU WERE LIVING?: 1

## 2025-08-14 SDOH — ECONOMIC STABILITY: FOOD INSECURITY: WITHIN THE PAST 12 MONTHS, THE FOOD YOU BOUGHT JUST DIDN'T LAST AND YOU DIDN'T HAVE MONEY TO GET MORE.: NEVER TRUE

## 2025-08-14 SDOH — ECONOMIC STABILITY: INCOME INSECURITY: IN THE PAST 12 MONTHS HAS THE ELECTRIC, GAS, OIL, OR WATER COMPANY THREATENED TO SHUT OFF SERVICES IN YOUR HOME?: NO

## 2025-08-14 SDOH — SOCIAL STABILITY: SOCIAL INSECURITY
WITHIN THE LAST YEAR, HAVE YOU BEEN RAPED OR FORCED TO HAVE ANY KIND OF SEXUAL ACTIVITY BY YOUR PARTNER OR EX-PARTNER?: NO

## 2025-08-14 SDOH — ECONOMIC STABILITY: HOUSING INSECURITY: IN THE LAST 12 MONTHS, WAS THERE A TIME WHEN YOU WERE NOT ABLE TO PAY THE MORTGAGE OR RENT ON TIME?: NO

## 2025-08-14 SDOH — ECONOMIC STABILITY: HOUSING INSECURITY: AT ANY TIME IN THE PAST 12 MONTHS, WERE YOU HOMELESS OR LIVING IN A SHELTER (INCLUDING NOW)?: NO

## 2025-08-14 SDOH — SOCIAL STABILITY: SOCIAL INSECURITY: ABUSE: ADULT

## 2025-08-14 SDOH — SOCIAL STABILITY: SOCIAL INSECURITY: HAS ANYONE EVER THREATENED TO HURT YOUR FAMILY OR YOUR PETS?: NO

## 2025-08-14 SDOH — ECONOMIC STABILITY: TRANSPORTATION INSECURITY: IN THE PAST 12 MONTHS, HAS LACK OF TRANSPORTATION KEPT YOU FROM MEDICAL APPOINTMENTS OR FROM GETTING MEDICATIONS?: NO

## 2025-08-14 SDOH — SOCIAL STABILITY: SOCIAL INSECURITY: ARE YOU OR HAVE YOU BEEN THREATENED OR ABUSED PHYSICALLY, EMOTIONALLY, OR SEXUALLY BY ANYONE?: NO

## 2025-08-14 SDOH — SOCIAL STABILITY: SOCIAL INSECURITY: HAVE YOU HAD THOUGHTS OF HARMING ANYONE ELSE?: NO

## 2025-08-14 SDOH — SOCIAL STABILITY: SOCIAL INSECURITY: DO YOU FEEL UNSAFE GOING BACK TO THE PLACE WHERE YOU ARE LIVING?: NO

## 2025-08-14 SDOH — SOCIAL STABILITY: SOCIAL INSECURITY: WERE YOU ABLE TO COMPLETE ALL THE BEHAVIORAL HEALTH SCREENINGS?: YES

## 2025-08-14 SDOH — SOCIAL STABILITY: SOCIAL INSECURITY: DO YOU FEEL ANYONE HAS EXPLOITED OR TAKEN ADVANTAGE OF YOU FINANCIALLY OR OF YOUR PERSONAL PROPERTY?: NO

## 2025-08-14 ASSESSMENT — PAIN - FUNCTIONAL ASSESSMENT
PAIN_FUNCTIONAL_ASSESSMENT: 0-10

## 2025-08-14 ASSESSMENT — PAIN SCALES - GENERAL
PAINLEVEL_OUTOF10: 2
PAINLEVEL_OUTOF10: 6
PAINLEVEL_OUTOF10: 4
PAINLEVEL_OUTOF10: 8
PAINLEVEL_OUTOF10: 0 - NO PAIN

## 2025-08-14 ASSESSMENT — COGNITIVE AND FUNCTIONAL STATUS - GENERAL
PATIENT BASELINE BEDBOUND: NO
WALKING IN HOSPITAL ROOM: A LITTLE
MOBILITY SCORE: 22
CLIMB 3 TO 5 STEPS WITH RAILING: A LOT
DAILY ACTIVITIY SCORE: 24
CLIMB 3 TO 5 STEPS WITH RAILING: A LITTLE
MOBILITY SCORE: 21
MOVING TO AND FROM BED TO CHAIR: A LITTLE
CLIMB 3 TO 5 STEPS WITH RAILING: A LOT
WALKING IN HOSPITAL ROOM: A LITTLE
STANDING UP FROM CHAIR USING ARMS: A LITTLE
MOBILITY SCORE: 19
CLIMB 3 TO 5 STEPS WITH RAILING: A LOT
DAILY ACTIVITIY SCORE: 24
STANDING UP FROM CHAIR USING ARMS: A LITTLE
WALKING IN HOSPITAL ROOM: A LITTLE
MOBILITY SCORE: 20
MOVING TO AND FROM BED TO CHAIR: A LITTLE
DAILY ACTIVITIY SCORE: 24

## 2025-08-14 ASSESSMENT — LIFESTYLE VARIABLES
HOW OFTEN DO YOU HAVE 6 OR MORE DRINKS ON ONE OCCASION: NEVER
AUDIT-C TOTAL SCORE: 0
HOW MANY STANDARD DRINKS CONTAINING ALCOHOL DO YOU HAVE ON A TYPICAL DAY: PATIENT DOES NOT DRINK
HOW OFTEN DO YOU HAVE A DRINK CONTAINING ALCOHOL: NEVER
AUDIT-C TOTAL SCORE: 0
SKIP TO QUESTIONS 9-10: 1

## 2025-08-14 ASSESSMENT — ACTIVITIES OF DAILY LIVING (ADL)
BATHING: INDEPENDENT
LACK_OF_TRANSPORTATION: NO
HEARING - RIGHT EAR: FUNCTIONAL
GROOMING: INDEPENDENT
ADL_ASSISTANCE: INDEPENDENT
DRESSING YOURSELF: INDEPENDENT
ASSISTIVE_DEVICE: WALKER
HEARING - LEFT EAR: FUNCTIONAL
LACK_OF_TRANSPORTATION: NO
FEEDING YOURSELF: INDEPENDENT
TOILETING: INDEPENDENT
LACK_OF_TRANSPORTATION: NO
PATIENT'S MEMORY ADEQUATE TO SAFELY COMPLETE DAILY ACTIVITIES?: YES
ADEQUATE_TO_COMPLETE_ADL: YES
JUDGMENT_ADEQUATE_SAFELY_COMPLETE_DAILY_ACTIVITIES: YES
WALKS IN HOME: INDEPENDENT

## 2025-08-14 ASSESSMENT — PAIN DESCRIPTION - LOCATION
LOCATION: FACE
LOCATION: HEAD
LOCATION: THROAT

## 2025-08-14 ASSESSMENT — PATIENT HEALTH QUESTIONNAIRE - PHQ9
2. FEELING DOWN, DEPRESSED OR HOPELESS: NOT AT ALL
SUM OF ALL RESPONSES TO PHQ9 QUESTIONS 1 & 2: 0
1. LITTLE INTEREST OR PLEASURE IN DOING THINGS: NOT AT ALL

## 2025-08-14 ASSESSMENT — PAIN DESCRIPTION - DESCRIPTORS
DESCRIPTORS: ACHING
DESCRIPTORS: ACHING;DISCOMFORT
DESCRIPTORS: ACHING

## 2025-08-15 ENCOUNTER — DOCUMENTATION (OUTPATIENT)
Dept: HOME HEALTH SERVICES | Facility: HOME HEALTH | Age: 65
End: 2025-08-15
Payer: MEDICARE

## 2025-08-15 ENCOUNTER — HOME CARE VISIT (OUTPATIENT)
Dept: HOME HEALTH SERVICES | Facility: HOME HEALTH | Age: 65
End: 2025-08-15
Payer: MEDICARE

## 2025-08-15 VITALS
RESPIRATION RATE: 20 BRPM | TEMPERATURE: 98.6 F | HEIGHT: 62 IN | OXYGEN SATURATION: 96 % | BODY MASS INDEX: 29.81 KG/M2 | DIASTOLIC BLOOD PRESSURE: 81 MMHG | SYSTOLIC BLOOD PRESSURE: 131 MMHG | HEART RATE: 96 BPM | WEIGHT: 162 LBS

## 2025-08-15 LAB
ANION GAP SERPL CALC-SCNC: 15 MMOL/L (ref 10–20)
BUN SERPL-MCNC: 18 MG/DL (ref 6–23)
CALCIUM SERPL-MCNC: 8.4 MG/DL (ref 8.6–10.3)
CHLORIDE SERPL-SCNC: 105 MMOL/L (ref 98–107)
CO2 SERPL-SCNC: 24 MMOL/L (ref 21–32)
CREAT SERPL-MCNC: 0.91 MG/DL (ref 0.5–1.05)
EGFRCR SERPLBLD CKD-EPI 2021: 70 ML/MIN/1.73M*2
ERYTHROCYTE [DISTWIDTH] IN BLOOD BY AUTOMATED COUNT: 12.2 % (ref 11.5–14.5)
FOLATE SERPL-MCNC: >24 NG/ML
GLUCOSE SERPL-MCNC: 97 MG/DL (ref 74–99)
HCT VFR BLD AUTO: 24 % (ref 36–46)
HGB BLD-MCNC: 7.8 G/DL (ref 12–16)
MCH RBC QN AUTO: 32.6 PG (ref 26–34)
MCHC RBC AUTO-ENTMCNC: 32.5 G/DL (ref 32–36)
MCV RBC AUTO: 100 FL (ref 80–100)
NRBC BLD-RTO: 0 /100 WBCS (ref 0–0)
PLATELET # BLD AUTO: 303 X10*3/UL (ref 150–450)
POTASSIUM SERPL-SCNC: 3.9 MMOL/L (ref 3.5–5.3)
RBC # BLD AUTO: 2.39 X10*6/UL (ref 4–5.2)
SODIUM SERPL-SCNC: 140 MMOL/L (ref 136–145)
WBC # BLD AUTO: 4.9 X10*3/UL (ref 4.4–11.3)

## 2025-08-15 PROCEDURE — 2500000004 HC RX 250 GENERAL PHARMACY W/ HCPCS (ALT 636 FOR OP/ED): Performed by: NURSE PRACTITIONER

## 2025-08-15 PROCEDURE — 85027 COMPLETE CBC AUTOMATED: CPT | Performed by: INTERNAL MEDICINE

## 2025-08-15 PROCEDURE — 99232 SBSQ HOSP IP/OBS MODERATE 35: CPT | Performed by: SURGERY

## 2025-08-15 PROCEDURE — 80048 BASIC METABOLIC PNL TOTAL CA: CPT | Performed by: INTERNAL MEDICINE

## 2025-08-15 PROCEDURE — 2500000005 HC RX 250 GENERAL PHARMACY W/O HCPCS: Performed by: HOSPITALIST

## 2025-08-15 PROCEDURE — 2500000001 HC RX 250 WO HCPCS SELF ADMINISTERED DRUGS (ALT 637 FOR MEDICARE OP)

## 2025-08-15 PROCEDURE — 99239 HOSP IP/OBS DSCHRG MGMT >30: CPT | Performed by: INTERNAL MEDICINE

## 2025-08-15 PROCEDURE — 2500000004 HC RX 250 GENERAL PHARMACY W/ HCPCS (ALT 636 FOR OP/ED): Mod: JW | Performed by: INTERNAL MEDICINE

## 2025-08-15 PROCEDURE — 36415 COLL VENOUS BLD VENIPUNCTURE: CPT | Performed by: INTERNAL MEDICINE

## 2025-08-15 RX ORDER — SCOPOLAMINE 1 MG/3D
PATCH, EXTENDED RELEASE TRANSDERMAL
OUTPATIENT
Start: 2025-08-15

## 2025-08-15 RX ORDER — TALC
6 POWDER (GRAM) TOPICAL ONCE
Status: COMPLETED | OUTPATIENT
Start: 2025-08-15 | End: 2025-08-15

## 2025-08-15 RX ADMIN — Medication 6 MG: at 00:34

## 2025-08-15 RX ADMIN — KETOROLAC TROMETHAMINE 15 MG: 30 INJECTION, SOLUTION INTRAMUSCULAR at 05:54

## 2025-08-15 RX ADMIN — ACETAMINOPHEN 650 MG: 650 SOLUTION ORAL at 00:22

## 2025-08-15 RX ADMIN — PANTOPRAZOLE SODIUM 40 MG: 40 INJECTION, POWDER, FOR SOLUTION INTRAVENOUS at 09:20

## 2025-08-15 ASSESSMENT — COGNITIVE AND FUNCTIONAL STATUS - GENERAL
MOBILITY SCORE: 23
CLIMB 3 TO 5 STEPS WITH RAILING: A LITTLE
DAILY ACTIVITIY SCORE: 24

## 2025-08-15 ASSESSMENT — PAIN - FUNCTIONAL ASSESSMENT
PAIN_FUNCTIONAL_ASSESSMENT: 0-10

## 2025-08-15 ASSESSMENT — PAIN SCALES - GENERAL
PAINLEVEL_OUTOF10: 0 - NO PAIN
PAINLEVEL_OUTOF10: 5 - MODERATE PAIN
PAINLEVEL_OUTOF10: 2

## 2025-08-15 ASSESSMENT — PAIN DESCRIPTION - LOCATION: LOCATION: THROAT

## 2025-08-15 ASSESSMENT — PAIN DESCRIPTION - DESCRIPTORS: DESCRIPTORS: ACHING;DISCOMFORT

## 2025-08-17 ENCOUNTER — HOME CARE VISIT (OUTPATIENT)
Dept: HOME HEALTH SERVICES | Facility: HOME HEALTH | Age: 65
End: 2025-08-17
Payer: MEDICARE

## 2025-08-17 VITALS
OXYGEN SATURATION: 99 % | WEIGHT: 162 LBS | DIASTOLIC BLOOD PRESSURE: 60 MMHG | HEART RATE: 80 BPM | HEIGHT: 62 IN | BODY MASS INDEX: 29.81 KG/M2 | TEMPERATURE: 98.2 F | RESPIRATION RATE: 16 BRPM | SYSTOLIC BLOOD PRESSURE: 120 MMHG

## 2025-08-17 PROCEDURE — G0162 HHC RN E&M PLAN SVS, 15 MIN: HCPCS | Mod: HHH

## 2025-08-17 ASSESSMENT — ENCOUNTER SYMPTOMS
PAIN: 1
PERSON REPORTING PAIN: PATIENT
HIGHEST PAIN SEVERITY IN PAST 24 HOURS: 5/10
ANGER WITHIN DEFINED LIMITS: 1
SLEEP QUALITY: FAIR
MUSCLE WEAKNESS: 1
LAST BOWEL MOVEMENT: 67434
STOOL FREQUENCY: TWICE DAILY
PAIN SEVERITY GOAL: 0/10
APPETITE LEVEL: FAIR
CHANGE IN APPETITE: DECREASED
SUBJECTIVE PAIN PROGRESSION: UNCHANGED
AGGRESSION WITHIN DEFINED LIMITS: 1
BOWEL PATTERN NORMAL: 1
LOWEST PAIN SEVERITY IN PAST 24 HOURS: 3/10

## 2025-08-17 ASSESSMENT — PAIN SCALES - PAIN ASSESSMENT IN ADVANCED DEMENTIA (PAINAD)
FACIALEXPRESSION: 0
NEGVOCALIZATION: 0
BODYLANGUAGE: 0
BREATHING: 0
BODYLANGUAGE: 0 - RELAXED.
CONSOLABILITY: 0 - NO NEED TO CONSOLE.
NEGVOCALIZATION: 0 - NONE.
TOTALSCORE: 0
CONSOLABILITY: 0
FACIALEXPRESSION: 0 - SMILING OR INEXPRESSIVE.

## 2025-08-17 ASSESSMENT — ACTIVITIES OF DAILY LIVING (ADL): OASIS_M1830: 05

## 2025-08-18 ASSESSMENT — ACTIVITIES OF DAILY LIVING (ADL): ENTERING_EXITING_HOME: NEEDS ASSISTANCE

## 2025-08-19 ENCOUNTER — HOME CARE VISIT (OUTPATIENT)
Dept: HOME HEALTH SERVICES | Facility: HOME HEALTH | Age: 65
End: 2025-08-19
Payer: MEDICARE

## 2025-08-19 VITALS — SYSTOLIC BLOOD PRESSURE: 130 MMHG | OXYGEN SATURATION: 99 % | DIASTOLIC BLOOD PRESSURE: 88 MMHG | HEART RATE: 95 BPM

## 2025-08-19 PROCEDURE — G0151 HHCP-SERV OF PT,EA 15 MIN: HCPCS | Mod: HHH

## 2025-08-19 ASSESSMENT — ENCOUNTER SYMPTOMS
PAIN LOCATION - PAIN SEVERITY: 5/10
PAIN LOCATION - PAIN QUALITY: ACHE
PERSON REPORTING PAIN: PATIENT
PAIN LOCATION: RIGHT HIP
PAIN: 1
PAIN LOCATION - PAIN FREQUENCY: CONSTANT

## 2025-08-19 ASSESSMENT — ACTIVITIES OF DAILY LIVING (ADL)
AMBULATION ASSISTANCE ON FLAT SURFACES: 1
AMBULATION_DISTANCE/DURATION_TOLERATED: 200

## 2025-08-20 ENCOUNTER — HOSPITAL ENCOUNTER (OUTPATIENT)
Dept: RADIOLOGY | Facility: HOSPITAL | Age: 65
Discharge: HOME | End: 2025-08-20
Payer: MEDICARE

## 2025-08-20 ENCOUNTER — OFFICE VISIT (OUTPATIENT)
Dept: ORTHOPEDIC SURGERY | Facility: HOSPITAL | Age: 65
End: 2025-08-20
Payer: MEDICARE

## 2025-08-20 ENCOUNTER — APPOINTMENT (OUTPATIENT)
Dept: ORTHOPEDIC SURGERY | Facility: HOSPITAL | Age: 65
End: 2025-08-20
Payer: MEDICARE

## 2025-08-20 DIAGNOSIS — M16.11 PRIMARY OSTEOARTHRITIS OF RIGHT HIP: ICD-10-CM

## 2025-08-20 DIAGNOSIS — Z96.641 STATUS POST RIGHT HIP REPLACEMENT: Primary | ICD-10-CM

## 2025-08-20 PROCEDURE — 1111F DSCHRG MED/CURRENT MED MERGE: CPT | Performed by: STUDENT IN AN ORGANIZED HEALTH CARE EDUCATION/TRAINING PROGRAM

## 2025-08-20 PROCEDURE — 1125F AMNT PAIN NOTED PAIN PRSNT: CPT | Performed by: STUDENT IN AN ORGANIZED HEALTH CARE EDUCATION/TRAINING PROGRAM

## 2025-08-20 PROCEDURE — 73502 X-RAY EXAM HIP UNI 2-3 VIEWS: CPT | Mod: RT

## 2025-08-20 PROCEDURE — 99212 OFFICE O/P EST SF 10 MIN: CPT

## 2025-08-20 PROCEDURE — 1159F MED LIST DOCD IN RCRD: CPT | Performed by: STUDENT IN AN ORGANIZED HEALTH CARE EDUCATION/TRAINING PROGRAM

## 2025-08-20 PROCEDURE — 99024 POSTOP FOLLOW-UP VISIT: CPT | Performed by: STUDENT IN AN ORGANIZED HEALTH CARE EDUCATION/TRAINING PROGRAM

## 2025-08-20 PROCEDURE — 73502 X-RAY EXAM HIP UNI 2-3 VIEWS: CPT | Mod: RIGHT SIDE | Performed by: RADIOLOGY

## 2025-08-20 ASSESSMENT — PAIN - FUNCTIONAL ASSESSMENT: PAIN_FUNCTIONAL_ASSESSMENT: 0-10

## 2025-08-20 ASSESSMENT — PAIN DESCRIPTION - DESCRIPTORS: DESCRIPTORS: ACHING

## 2025-08-20 ASSESSMENT — PAIN SCALES - GENERAL: PAINLEVEL_OUTOF10: 3

## 2025-08-22 ENCOUNTER — HOME CARE VISIT (OUTPATIENT)
Dept: HOME HEALTH SERVICES | Facility: HOME HEALTH | Age: 65
End: 2025-08-22
Payer: MEDICARE

## 2025-08-22 VITALS — HEART RATE: 113 BPM | DIASTOLIC BLOOD PRESSURE: 84 MMHG | OXYGEN SATURATION: 99 % | SYSTOLIC BLOOD PRESSURE: 138 MMHG

## 2025-08-22 PROCEDURE — G0151 HHCP-SERV OF PT,EA 15 MIN: HCPCS | Mod: HHH

## 2025-08-22 ASSESSMENT — PAIN DESCRIPTION - PAIN TYPE: TYPE: SURGICAL PAIN

## 2025-08-26 ENCOUNTER — HOME CARE VISIT (OUTPATIENT)
Dept: HOME HEALTH SERVICES | Facility: HOME HEALTH | Age: 65
End: 2025-08-26
Payer: MEDICARE

## 2025-08-26 VITALS — HEART RATE: 90 BPM | DIASTOLIC BLOOD PRESSURE: 80 MMHG | OXYGEN SATURATION: 97 % | SYSTOLIC BLOOD PRESSURE: 118 MMHG

## 2025-08-26 PROCEDURE — G0151 HHCP-SERV OF PT,EA 15 MIN: HCPCS | Mod: HHH

## 2025-08-26 ASSESSMENT — ACTIVITIES OF DAILY LIVING (ADL)
AMBULATION ASSISTANCE ON FLAT SURFACES: 1
AMBULATION ASSISTANCE ON UNEVEN SURFACES: 1

## 2025-08-27 ENCOUNTER — HOME CARE VISIT (OUTPATIENT)
Dept: HOME HEALTH SERVICES | Facility: HOME HEALTH | Age: 65
End: 2025-08-27
Payer: MEDICARE

## 2025-08-28 ENCOUNTER — HOME CARE VISIT (OUTPATIENT)
Dept: HOME HEALTH SERVICES | Facility: HOME HEALTH | Age: 65
End: 2025-08-28
Payer: MEDICARE

## 2025-08-28 PROCEDURE — G0151 HHCP-SERV OF PT,EA 15 MIN: HCPCS | Mod: HHH

## 2025-08-28 ASSESSMENT — ENCOUNTER SYMPTOMS
PERSON REPORTING PAIN: PATIENT
PAIN LOCATION - PAIN SEVERITY: 1/10
PAIN: 1
PAIN LOCATION: RIGHT HIP

## 2025-08-28 ASSESSMENT — ACTIVITIES OF DAILY LIVING (ADL)
HOME_HEALTH_OASIS: 00
AMBULATION_DISTANCE/DURATION_TOLERATED: 200
AMBULATION ASSISTANCE ON FLAT SURFACES: 1
OASIS_M1830: 01

## (undated) DEVICE — DRAPE, SHEET, THREE QUARTER, FAN FOLD, 57 X 77 IN

## (undated) DEVICE — GLOVE, PROTEXIS PI CLASSIC, SZ-7.0, PF, LF

## (undated) DEVICE — DRAPE, ISOLATION, ANTIMICROBIAL, W/POUCH, IOBAN 2, STERI DRAPE, 125 X 83 IN, DISPOSABLE, STERILE

## (undated) DEVICE — DRESSING, MEPILEX BORDER, POST-OP AG, 4 X 8 IN

## (undated) DEVICE — Device

## (undated) DEVICE — INTERPULSE HANDPIECE SET W/ 10FT SUCTION TUBING

## (undated) DEVICE — ELECTRODE, ELECTROSURGICAL, BLADE, STANDARD, 2.75 IN

## (undated) DEVICE — HIGH FLOW TIP FOR INTERPULSE HANDPIECE SET

## (undated) DEVICE — DRAPE, INCISE, ANTIMICROBIAL, IOBAN 2, STERI DRAPE, 23 X 33 IN, DISPOSABLE, STERILE

## (undated) DEVICE — SYRINGE, 60 CC, IRRIGATION, BULB, CONTRO-BULB, PAPER POUCH

## (undated) DEVICE — CLOSURE SYSTEM, DERMABOND, PRINEO, 22CM, STERILE

## (undated) DEVICE — COVER, MAYO STAND, W/PAD, 23 IN, DISPOSABLE, PLASTIC, LF, STERILE

## (undated) DEVICE — SYRINGE, 50 CC, LUER LOCK

## (undated) DEVICE — STAPLER, SKIN PROXIMATE, 35 WIDE

## (undated) DEVICE — SUTURE, MONOCRYL, 3-0, 27 IN, PS-2, UNDYED

## (undated) DEVICE — SUTURE, VICRYL PLUS, 0, 8X18IN, CT-1, UND, BRAIDED

## (undated) DEVICE — BLADE, SAW PFC DUAL CUT 25 X 90

## (undated) DEVICE — SUTURE, VICRYL PLUS 3-0, SH, 27IN

## (undated) DEVICE — WOUND SYSTEM, DEBRIDEMENT & CLEANING, O.R DUOPAK

## (undated) DEVICE — SUTURE, STRATAFIX, SYMMETRIC PDS PLUS, SIZE 1, 45CM, CT 40MM VIOLET

## (undated) DEVICE — TOWEL, SURGICAL, NEURO, O/R, 16 X 26, BLUE, STERILE

## (undated) DEVICE — GLOVE, SURGICAL, PROTEXIS NEOPRENE, 7.0, PF, LF

## (undated) DEVICE — COVER, C-ARM W/CLIPS, OEC GE

## (undated) DEVICE — POSITION KIT, HANA PROFX SPINE

## (undated) DEVICE — SUTURE, ETHIBOND XTRA, 5 V-37, GRN/BR, LF

## (undated) DEVICE — MARKER, SKIN, DUAL TIP INK W/9 LABEL AND REMOVABLE TIME OUT SLEEVE

## (undated) DEVICE — APPLICATOR, CHLORAPREP, W/ORANGE TINT, 26ML